# Patient Record
Sex: FEMALE | Race: WHITE | NOT HISPANIC OR LATINO | Employment: UNEMPLOYED | URBAN - METROPOLITAN AREA
[De-identification: names, ages, dates, MRNs, and addresses within clinical notes are randomized per-mention and may not be internally consistent; named-entity substitution may affect disease eponyms.]

---

## 2017-02-16 PROBLEM — J32.9 SINUSITIS: Status: ACTIVE | Noted: 2017-02-16

## 2017-03-31 PROBLEM — G47.01 INSOMNIA DUE TO MEDICAL CONDITION: Status: ACTIVE | Noted: 2017-03-31

## 2019-10-07 PROBLEM — D50.9 MICROCYTIC ANEMIA: Status: ACTIVE | Noted: 2019-10-07

## 2019-10-07 PROBLEM — R55 SYNCOPE: Status: ACTIVE | Noted: 2019-10-07

## 2019-10-07 PROBLEM — R10.9 ABDOMINAL PAIN: Status: ACTIVE | Noted: 2019-10-07

## 2019-11-01 PROBLEM — R10.9 ABDOMINAL PAIN: Status: RESOLVED | Noted: 2019-10-07 | Resolved: 2019-11-01

## 2019-12-19 PROBLEM — J32.9 SINUSITIS: Status: RESOLVED | Noted: 2017-02-16 | Resolved: 2019-12-19

## 2020-07-01 ENCOUNTER — OFFICE VISIT (OUTPATIENT)
Dept: SPINE | Facility: CLINIC | Age: 50
End: 2020-07-01
Payer: COMMERCIAL

## 2020-07-01 VITALS
HEIGHT: 62 IN | TEMPERATURE: 99 F | HEART RATE: 65 BPM | BODY MASS INDEX: 24.2 KG/M2 | DIASTOLIC BLOOD PRESSURE: 79 MMHG | WEIGHT: 131.5 LBS | SYSTOLIC BLOOD PRESSURE: 119 MMHG

## 2020-07-01 DIAGNOSIS — M54.12 RADICULOPATHY, CERVICAL REGION: ICD-10-CM

## 2020-07-01 DIAGNOSIS — M50.30 DDD (DEGENERATIVE DISC DISEASE), CERVICAL: ICD-10-CM

## 2020-07-01 PROCEDURE — 99999 PR PBB SHADOW E&M-EST. PATIENT-LVL V: ICD-10-PCS | Mod: PBBFAC,,, | Performed by: PHYSICIAN ASSISTANT

## 2020-07-01 PROCEDURE — 99999 PR PBB SHADOW E&M-EST. PATIENT-LVL V: CPT | Mod: PBBFAC,,, | Performed by: PHYSICIAN ASSISTANT

## 2020-07-01 PROCEDURE — 99204 PR OFFICE/OUTPT VISIT, NEW, LEVL IV, 45-59 MIN: ICD-10-PCS | Mod: S$GLB,,, | Performed by: PHYSICIAN ASSISTANT

## 2020-07-01 PROCEDURE — 99204 OFFICE O/P NEW MOD 45 MIN: CPT | Mod: S$GLB,,, | Performed by: PHYSICIAN ASSISTANT

## 2020-07-01 NOTE — LETTER
July 1, 2020      [place]  [address] [facility] [address]  [facility]  [place] [address]           [facility]  [address] [place] [address]  Phone: [phone]  Fax: [phone]          Patient: Helen Pickard   MR Number: [id number]   YOB: 1970   Date of Visit: 7/1/2020       Dear Dr. Tavares Newsome II:    Thank you for referring Helen Pickard to me for evaluation. Attached you will find relevant portions of my assessment and plan of care.    If you have questions, please do not hesitate to call me. I look forward to following Helen Pickard along with you.    Sincerely,    ADRIANA Rinconosure  CC:  No Recipients    If you would like to receive this communication electronically, please contact [email or web address] or [phone] to request more information on [employer] access.    For providers and/or their staff who would like to refer a patient to [facility], please contact us through our one-stop-shop provider referral line, [facility], at [phone].    If you feel you have received this communication in error or would no longer like to receive these types of communications, please e-mail [email or web address]

## 2020-07-01 NOTE — PROGRESS NOTES
[facility]    Patient ID: Helen Pickard is a 49 y.o. female.    Chief Complaint   Patient presents with    Neck Pain    Shoulder Pain       Review of Systems   Constitutional: Negative for activity change, chills, fever and unexpected weight change.   HENT: Negative for hearing loss, tinnitus, trouble swallowing and voice change.    Eyes: Negative.  Negative for visual disturbance.   Respiratory: Negative for apnea, chest tightness and shortness of breath.    Cardiovascular: Negative.  Negative for chest pain and palpitations.   Gastrointestinal: Negative.  Negative for abdominal pain, constipation, diarrhea, nausea and vomiting.   Genitourinary: Negative.  Negative for difficulty urinating, dysuria and frequency.   Musculoskeletal: Positive for myalgias, neck pain and neck stiffness. Negative for back pain and gait problem.   Skin: Negative for wound.   Allergic/Immunologic: Negative for immunocompromised state.   Neurological: Positive for weakness. Negative for dizziness, tremors, seizures, facial asymmetry, speech difficulty, light-headedness, numbness and headaches.   Hematological: Negative for adenopathy. Does not bruise/bleed easily.   Psychiatric/Behavioral: Negative for confusion, decreased concentration, dysphoric mood and sleep disturbance.       Past Medical History:   Diagnosis Date    b Hypertension     8/3/15 RXd A Low Salt Diet     c Hypercholesterolemia With High HDL     9/25/18 RXd Lifestyle Changes    c Mild Hypertriglyceridemia     d Family H/O DM     f Obesity     This; 9/24/18 TFTs, JULIANA, And Cortisol = Unremarkable    f Osteopenia     10/19/18 RXd Fosamax 35 Mg Weekly, Continued Her OTC D3 5K IU daily, And RXd OTC CaCO3 1,200 Mg Daily; She Is Also On HRT    f Weight Gain ###    9/20/18 Likely Due To Rixulti And I Recommended She F/U With Her Psychiatrist For This; 9/24/18 TFTs, JULIANA, And Cortisol = Unremarkable    g Chronic Mild Iron Deficiency Anemia      "1/31/20 RXd Ferrous Gluconate 324 Mg Daily; Likely Do To Post Bariatric Surgery Decreased Absorption    j Family H/O Colon Polyps     Dr. Justino Mckeon 12/14/15: "Repeat TC In 5 YRs"    j GERD     j H/O Bariatric Sleeve Gastrectomy In 2011 ###    5/2/15 Fe+ Studies And B12 = Normal    j H/O Laparoscopic Cholecystectomy On 10/7/19     Dr. Rc canela H/O Right Hemicolectomy 2014 For (A Benign) Appendix     Dr. Ilda canela Irritable Bowel Syndrome     Dr. Justino Mckeon    k H/O Left Breast Lumpectomy For Benign Mass In 2014     Dr. Ilda mcclain Postmenopausal On Chronic Hormone Replacement Therapy     l Bilateral Hand Tendonitis     5/2/15 RF, SARKIS, UA, CPK And ESR = Normal    l Fibromyalgia ###    6/3/19 (Phone Log) RXd Amitriptyline 50 Mg BID; 6/3/19 RXd Savella 25 Mg BID But This Was Too Expensive; 12/4/15 Referred To Dr. Wu Baird; Lyrica And Cymbalta And Neurontin Innefective; 5/2/15 RF, SARKIS, UA, CPK And ESR = Normal    l Left 3rd Finger Laceration 8/2/15     l Lumbar DDD With Chronic LBP     Dr. Tristan Banda Started Her On Lyrica    l Right Arthropathy Shoulder     5/8/20 Referred To Dr. Tavares otoole Facial And RUE Muscle Twitching     12/4/15 Referred To Dr. Wu Baird    n Anxiety And Depression ###    11/4/19 (Phone Log) RXd Xanax 0.25 Mg Daily PRN., [facility] Saw Her On 12/12/16 And 6/22/16 And 5/17/16     n Attention Deficit Hyperactivity Disorder ###    11/2015 She Tolerated Concerta But Stopped It; Aderrall And Vyvanse Caused Moodiness    n Bipolar Affective Disorder ###    [facility] Saw Her On 12/12/16 And 6/22/16 And 5/17/16     o Migraines     q Vitamin D Deficiency     9/25/18 RXd OTC D3 5K IU Daily    Wellness Visit 5/8/2020      Past Surgical History:   Procedure Laterality Date    APPENDECTOMY      BREAST BIOPSY      CHOLECYSTECTOMY  " 10/07/2019    COLON SURGERY      HYSTERECTOMY      LAPAROSCOPIC CHOLECYSTECTOMY N/A 10/7/2019    Procedure: CHOLECYSTECTOMY, LAPAROSCOPIC;  Surgeon: Rc Rico MD;  Location: [facility];  Service: General;  Laterality: N/A;    STOMACH SURGERY      gastric sleeve     Social History     Socioeconomic History    Marital status:      Spouse name: Edilberto    Number of children: 3    Years of education: Not on file    Highest education level: Not on file   Occupational History    Not on file   Social Needs    Financial resource strain: Not on file    Food insecurity     Worry: Never true     Inability: Never true    Transportation needs     Medical: No     Non-medical: No   Tobacco Use    Smoking status: Never Smoker    Smokeless tobacco: Never Used   Substance and Sexual Activity    Alcohol use: Yes     Frequency: Never     Binge frequency: Never     Comment: occasionally    Drug use: Never    Sexual activity: Yes     Partners: Male     Birth control/protection: See Surgical Hx, None   Lifestyle    Physical activity     Days per week: 0 days     Minutes per session: 0 min    Stress: Very much   Relationships    Social connections     Talks on phone: More than three times a week     Gets together: Once a week     Attends [employer] service: Not on file     Active member of club or organization: Yes     Attends meetings of clubs or organizations: More than 4 times per year     Relationship status: Patient refused   Other Topics Concern    Not on file   Social History Narrative    Not on file     Family History   Problem Relation Age of Onset    Asthma Mother     Depression Mother     Hypertension Mother     Hyperlipidemia Mother     Anxiety disorder Mother     Thyroid disease Mother     Depression Father     Hypertension Father     Heart disease Father     Cancer Maternal Grandfather         LEUKEMIA     Review of patient's allergies indicates:   Allergen Reactions    Gabapentin Other  (See Comments)     Confusion, brain fog and thomas    Iodinated contrast media Shortness Of Breath     Itchy, nausea    Abilify [aripiprazole]     Blue dye     Elavil [amitriptyline]     Mirabegron     Morphine      Hallucinations      Red dye     Rexulti [brexpiprazole]     Tizanidine     Tramadol Other (See Comments)     Feels drunk    Wellbutrin [bupropion hcl] Other (See Comments)     Depressed and couldn't get out of bed    Yellow dye        Current Outpatient Medications:     acetaminophen (TYLENOL) 325 MG tablet, Take 2 tablets (650 mg total) by mouth every 8 (eight) hours as needed for Temperature greater than (or equal to 101 degree F)., Disp: , Rfl: 0    alendronate (FOSAMAX) 35 MG tablet, Take 1 tablet (35 mg total) by mouth every 7 days. (Patient taking differently: Take 35 mg by mouth Every Friday. ), Disp: 12 tablet, Rfl: 3    cetirizine (ZYRTEC) 10 MG tablet, Take 10 mg by mouth once daily., Disp: , Rfl:     cholecalciferol, vitamin D3, 5,000 unit capsule, , Disp: , Rfl:     estradioL (ESTRACE) 1 MG tablet, , Disp: , Rfl:     Lactobacillus acidophilus (PROBIOTIC) 10 billion cell Cap, Probiotic, Disp: , Rfl:     lamoTRIgine (LAMICTAL) 25 MG tablet, , Disp: , Rfl:     losartan (COZAAR) 100 MG tablet, Take 1 tablet (100 mg total) by mouth once daily., Disp: 90 tablet, Rfl: 1    ondansetron (ZOFRAN) 4 MG tablet, Take 1 tablet (4 mg total) by mouth every 6 (six) hours as needed for Nausea., Disp: 20 tablet, Rfl: 0    ondansetron (ZOFRAN-ODT) 4 MG TbDL, Take 2 tablets (8 mg total) by mouth every 6 (six) hours as needed (nausea)., Disp: 60 tablet, Rfl: 1    pantoprazole (PROTONIX) 40 MG tablet, Take 1 tablet (40 mg total) by mouth 2 (two) times daily., Disp: 180 tablet, Rfl: 1    polyethylene glycol (GLYCOLAX) 17 gram PwPk, Take 17 g by mouth once daily., Disp: 20 each, Rfl: 0    psyllium (METAMUCIL) powder, Take 1 packet by mouth 3 (three) times daily., Disp: , Rfl:     traZODone  "(DESYREL) 100 MG tablet, Take 400 mg by mouth every evening. , Disp: , Rfl:     TRINTELLIX 20 mg Tab, Take 1 tablet by mouth once daily., Disp: , Rfl: 2    ALPRAZolam (XANAX) 0.25 MG tablet, Take 1-2 tablets (0.25-0.5 mg total) by mouth 2 (two) times daily as needed for Anxiety., Disp: 30 tablet, Rfl: 0    docusate sodium (COLACE) 100 MG capsule, Take 1 capsule (100 mg total) by mouth 2 (two) times daily as needed for Constipation., Disp: , Rfl: 0    Vitals:    07/01/20 1427   BP: 119/79   Pulse: 65   Temp: 98.7 °F (37.1 °C)   Weight: 59.6 kg (131 lb 8.1 oz)   Height: 5' 2" (1.575 m)   PainSc:   6   PainLoc: Neck      Estimated body mass index is 24.05 kg/m² as calculated from the following:    Height as of this encounter: 5' 2" (1.575 m).    Weight as of this encounter: 59.6 kg (131 lb 8.1 oz).    Physical Exam  Vitals signs and nursing note reviewed.   Constitutional:       Appearance: Normal appearance. She is well-developed.   HENT:      Head: Normocephalic and atraumatic.      Right Ear: Hearing normal.      Left Ear: Hearing normal.      Nose: Nose normal.      Mouth/Throat:      Pharynx: Uvula midline.   Eyes:      General: Lids are normal.      Extraocular Movements: EOM normal.      Conjunctiva/sclera: Conjunctivae normal.      Pupils: Pupils are equal, round, and reactive to light.   Neck:      Musculoskeletal: Full passive range of motion without pain, normal range of motion and neck supple.      Trachea: Trachea and phonation normal.   Cardiovascular:      Rate and Rhythm: Normal rate and regular rhythm.      Pulses: Normal pulses.   Pulmonary:      Effort: Pulmonary effort is normal.      Breath sounds: Normal breath sounds.   Abdominal:      Palpations: Abdomen is soft.   Musculoskeletal: Normal range of motion.   Feet:      Right foot:      Protective Sensation: 4 sites tested. 4 sites sensed.      Skin integrity: No ulcer.      Left foot:      Protective Sensation: 4 sites tested. 4 sites sensed. "      Skin integrity: No ulcer.   Skin:     General: Skin is warm and dry.      Capillary Refill: Capillary refill takes less than 2 seconds.   Neurological:      Mental Status: She is alert and oriented to person, place, and time.      Cranial Nerves: No cranial nerve deficit.      Sensory: No sensory deficit.      Coordination: Finger-Nose-Finger Test, Heel to Shin Test and Romberg Test normal.      Gait: Gait is intact. Tandem walk normal.      Deep Tendon Reflexes: Strength normal and reflexes are normal and symmetric.      Reflex Scores:       Tricep reflexes are 2+ on the right side and 2+ on the left side.       Bicep reflexes are 2+ on the right side and 2+ on the left side.       Brachioradialis reflexes are 2+ on the right side and 2+ on the left side.       Patellar reflexes are 2+ on the right side and 2+ on the left side.       Achilles reflexes are 2+ on the right side and 2+ on the left side.  Psychiatric:         Speech: Speech normal.         Behavior: Behavior normal.         Thought Content: Thought content normal.         Judgment: Judgment normal.         Neurologic Exam     Mental Status   Oriented to person, place, and time.   Follows 3 step commands.   Attention: normal. Concentration: normal.   Speech: speech is normal   Level of consciousness: alert  Knowledge: good.   Able to name object.     Cranial Nerves     CN II   Visual fields full to confrontation.   Visual acuity: normal  Right visual field deficit: none  Left visual field deficit: none     CN III, IV, VI   Pupils are equal, round, and reactive to light.  Extraocular motions are normal.   CN III: no CN III palsy  CN VI: no CN VI palsy    CN V   Facial sensation intact.   Right facial sensation deficit: none  Left facial sensation deficit: none    CN VII   Facial expression full, symmetric.   Right facial weakness: none  Left facial weakness: none    CN VIII   CN VIII normal.   Hearing: intact    CN IX, X   CN IX normal.   CN X  normal.     CN XI   CN XI normal.     CN XII   CN XII normal.     Motor Exam   Muscle bulk: normal  Overall muscle tone: normal  Right arm tone: normal  Left arm tone: normal  Right arm pronator drift: absent  Left arm pronator drift: absent  Right leg tone: normal  Left leg tone: normal    Strength   Strength 5/5 throughout.   Right neck flexion: 5/5  Left neck flexion: 5/5  Right neck extension: 5/5  Left neck extension: 5/5  Right deltoid: 5/5  Left deltoid: 5/5  Right biceps: 5/5  Left biceps: 5/5  Right triceps: 5/5  Left triceps: 5/5  Right wrist flexion: 5/5  Left wrist flexion: 5/5  Right wrist extension: 5/5  Left wrist extension: 5/5  Right interossei: 5/5  Left interossei: 5/5  Right abdominals: 5/5  Left abdominals: 5/5  Right iliopsoas: 5/5  Left iliopsoas: 5/5  Right quadriceps: 5/5  Left quadriceps: 5/5  Right hamstrin/5  Left hamstrin/5  Right glutei: 5/5  Left glutei: 5/5  Right anterior tibial: 5/5  Left anterior tibial: 5/5  Right posterior tibial: 5/5  Left posterior tibial: 5/5  Right peroneal: 5/5  Left peroneal: 5/5  Right gastroc: 5/5  Left gastroc: 5/5    Sensory Exam   Light touch normal.   Right arm light touch: normal  Left arm light touch: normal  Right leg light touch: normal  Left leg light touch: normal  Vibration normal.     Gait, Coordination, and Reflexes     Gait  Gait: normal    Coordination   Romberg: negative  Finger to nose coordination: normal  Heel to shin coordination: normal  Tandem walking coordination: normal    Tremor   Resting tremor: absent  Intention tremor: absent  Action tremor: absent    Reflexes   Right brachioradialis: 2+  Left brachioradialis: 2+  Right biceps: 2+  Left biceps: 2+  Right triceps: 2+  Left triceps: 2+  Right patellar: 2+  Left patellar: 2+  Right achilles: 2+  Left achilles: 2+  Right : 2+  Left : 2+  Right plantar: normal  Left plantar: normal  Right Solorzano: absent  Left Solorzano: absent  Right ankle clonus: absent  Left ankle  clonus: absent      X-Ray Cervical Spine Complete 5 view  Narrative: EXAMINATION:  XR CERVICAL SPINE COMPLETE 5 VIEW    CLINICAL HISTORY:  . Cervicalgia    TECHNIQUE:  AP, Lateral, bilateral oblique and open mouth views of the cervical spine were performed.    COMPARISON:  05/16/2015    FINDINGS:  There is no apparent fracture or subluxation.  The prevertebral soft tissue space is not widened.  There are degenerative changes present involving mainly the C4-C5 and C5-C6 level.  Findings consist of vertebral body osteophyte formation and some narrowing of the disc spaces.  No facet joint or foraminal abnormalities were identified.  Impression: There are mild degenerative changes as described.    Electronically signed by: Aly Saucedo MD  Date:    06/29/2020  Time:    15:21      Visit Diagnosis:  DDD (degenerative disc disease), cervical  -     Ambulatory referral/consult to Neurosurgery        Provider dictation:  Oswestry score: 28%  PHQ:  10    The patient is a 49-year-old right-hand-dominant female with a past medical history of hypertension, hypercholesterolemia, previous bariatric sleeve gastrectomy, hemicolectomy, irritable bowel syndrome, fibromyalgia, anxiety and depression, attention deficit hyperactivity disorder, bipolar affective disorder, migraines that presents today for evaluation of chronic neck pain. She was referred to Dr. Tran to Dr. Mendoza.  She has had neck pain for several years however she attributed to her fibromyalgia.  She has pain that radiates from the cervical spine to the right shoulder and down to the right arm and hand.  Her arms feel heavy and tired.  She received a steroid shot in her right shoulder with only minimal relief of her symptoms.  She denies bowel or bladder dysfunction.  She does report gait disturbance with increased falls.  She reports her right arm is weaker than her left.  She is dropping things.  She has never had epidural injections in the cervical spine    On  physical examination, gait and station are normal.  Normal tandem walk.  She has mild weakness at in the bilateral upper extremities that is generalized, possible effort limited.  Muscle stretch reflexes are maintained.  Negative Solorzano's.  Negative clonus.    X-ray of the cervical spine demonstrates degenerative changes worse at C5-C6 with straightening of the normal cervical lordosis.  X-ray of the thoracic spine shows mild thoracic scoliosis.    The patient has tried physical therapy multiple times without relief of her symptoms and continues to have right-sided neck pain that has progressed in severity and now complaining of weakness.  Given the failure of conservative management, recommended MRI of the cervical spine with follow-up for further evaluation.  She may continue physical therapy in the interim.        This note was done using voice recognition software. Please excuse any errors missed in proof reading.

## 2020-07-09 ENCOUNTER — OFFICE VISIT (OUTPATIENT)
Dept: SPINE | Facility: CLINIC | Age: 50
End: 2020-07-09
Payer: COMMERCIAL

## 2020-07-09 VITALS — SYSTOLIC BLOOD PRESSURE: 107 MMHG | TEMPERATURE: 98 F | HEART RATE: 59 BPM | DIASTOLIC BLOOD PRESSURE: 70 MMHG

## 2020-07-09 DIAGNOSIS — M25.512 CHRONIC PAIN OF BOTH SHOULDERS: ICD-10-CM

## 2020-07-09 DIAGNOSIS — M25.511 CHRONIC PAIN OF BOTH SHOULDERS: ICD-10-CM

## 2020-07-09 DIAGNOSIS — M54.2 NECK PAIN: Primary | ICD-10-CM

## 2020-07-09 DIAGNOSIS — G89.29 CHRONIC PAIN OF BOTH SHOULDERS: ICD-10-CM

## 2020-07-09 DIAGNOSIS — M54.10 RADICULOPATHY, UNSPECIFIED SPINAL REGION: ICD-10-CM

## 2020-07-09 PROCEDURE — 99999 PR PBB SHADOW E&M-EST. PATIENT-LVL III: CPT | Mod: PBBFAC,,, | Performed by: PHYSICIAN ASSISTANT

## 2020-07-09 PROCEDURE — 99214 OFFICE O/P EST MOD 30 MIN: CPT | Mod: S$GLB,,, | Performed by: PHYSICIAN ASSISTANT

## 2020-07-09 PROCEDURE — 99999 PR PBB SHADOW E&M-EST. PATIENT-LVL III: ICD-10-PCS | Mod: PBBFAC,,, | Performed by: PHYSICIAN ASSISTANT

## 2020-07-09 PROCEDURE — 99214 PR OFFICE/OUTPT VISIT, EST, LEVL IV, 30-39 MIN: ICD-10-PCS | Mod: S$GLB,,, | Performed by: PHYSICIAN ASSISTANT

## 2020-07-09 RX ORDER — CLONAZEPAM 0.5 MG/1
TABLET ORAL
COMMUNITY
Start: 2020-07-06 | End: 2020-12-10

## 2020-07-09 RX ORDER — ZIPRASIDONE HYDROCHLORIDE 40 MG/1
40 CAPSULE ORAL 2 TIMES DAILY
COMMUNITY
Start: 2020-07-06 | End: 2020-12-10

## 2020-07-09 NOTE — PROGRESS NOTES
[facility]    Patient ID: Helen Pickard is a 49 y.o. female.    Chief Complaint   Patient presents with    Follow-up     MRI Results       Review of Systems   Constitutional: Negative for activity change, chills, fever and unexpected weight change.   HENT: Negative for hearing loss, tinnitus, trouble swallowing and voice change.    Eyes: Negative.  Negative for visual disturbance.   Respiratory: Negative for apnea, chest tightness and shortness of breath.    Cardiovascular: Negative.  Negative for chest pain and palpitations.   Gastrointestinal: Negative.  Negative for abdominal pain, constipation, diarrhea, nausea and vomiting.   Genitourinary: Negative.  Negative for difficulty urinating, dysuria and frequency.   Musculoskeletal: Positive for myalgias, neck pain and neck stiffness. Negative for back pain and gait problem.   Skin: Negative for wound.   Allergic/Immunologic: Negative for immunocompromised state.   Neurological: Positive for weakness. Negative for dizziness, tremors, seizures, facial asymmetry, speech difficulty, light-headedness, numbness and headaches.   Hematological: Negative for adenopathy. Does not bruise/bleed easily.   Psychiatric/Behavioral: Negative for confusion, decreased concentration, dysphoric mood and sleep disturbance.       Past Medical History:   Diagnosis Date    b Hypertension     8/3/15 RXd A Low Salt Diet     c Hypercholesterolemia With High HDL     9/25/18 RXd Lifestyle Changes    c Mild Hypertriglyceridemia     d Family H/O DM     f Obesity     This; 9/24/18 TFTs, JULIANA, And Cortisol = Unremarkable    f Osteopenia     10/19/18 RXd Fosamax 35 Mg Weekly, Continued Her OTC D3 5K IU daily, And RXd OTC CaCO3 1,200 Mg Daily; She Is Also On HRT    f Weight Gain ###    9/20/18 Likely Due To Rixulti And I Recommended She F/U With Her Psychiatrist For This; 9/24/18 TFTs, JULIANA, And Cortisol = Unremarkable    g Chronic Mild Iron Deficiency Anemia      "1/31/20 RXd Ferrous Gluconate 324 Mg Daily; Likely Do To Post Bariatric Surgery Decreased Absorption    j Family H/O Colon Polyps     Dr. Justino Mckeon 12/14/15: "Repeat TC In 5 YRs"    j GERD     j H/O Bariatric Sleeve Gastrectomy In 2011 ###    5/2/15 Fe+ Studies And B12 = Normal    j H/O Laparoscopic Cholecystectomy On 10/7/19     Dr. Rc canela H/O Right Hemicolectomy 2014 For (A Benign) Appendix     Dr. Ilda canela Irritable Bowel Syndrome     Dr. Justino Mckeon    k H/O Left Breast Lumpectomy For Benign Mass In 2014     Dr. Ilda mcclain Postmenopausal On Chronic Hormone Replacement Therapy     l Bilateral Hand Tendonitis     5/2/15 RF, SARKIS, UA, CPK And ESR = Normal    l Fibromyalgia ###    6/3/19 (Phone Log) RXd Amitriptyline 50 Mg BID; 6/3/19 RXd Savella 25 Mg BID But This Was Too Expensive; 12/4/15 Referred To Dr. Wu Baird; Lyrica And Cymbalta And Neurontin Innefective; 5/2/15 RF, SARKIS, UA, CPK And ESR = Normal    l Left 3rd Finger Laceration 8/2/15     l Lumbar DDD With Chronic LBP     Dr. Tristan Banda Started Her On Lyrica    l Right Arthropathy Shoulder     5/8/20 Referred To Dr. Tavares otoole Facial And RUE Muscle Twitching     12/4/15 Referred To Dr. Wu Baird    n Anxiety And Depression ###    11/4/19 (Phone Log) RXd Xanax 0.25 Mg Daily PRN., Dr. Daisy Cook ([facility]) Saw Her On 12/12/16 And 6/22/16 And 5/17/16     n Attention Deficit Hyperactivity Disorder ###    11/2015 She Tolerated Concerta But Stopped It; Aderrall And Vyvanse Caused Moodiness    n Bipolar Affective Disorder ###    Dr. Daisy Cook ([facility]) Saw Her On 12/12/16 And 6/22/16 And 5/17/16     o Migraines     q Vitamin D Deficiency     9/25/18 RXd OTC D3 5K IU Daily    Wellness Visit 5/8/2020      Past Surgical History:   Procedure Laterality Date    APPENDECTOMY      BREAST BIOPSY      CHOLECYSTECTOMY  " 10/07/2019    COLON SURGERY      HYSTERECTOMY      LAPAROSCOPIC CHOLECYSTECTOMY N/A 10/7/2019    Procedure: CHOLECYSTECTOMY, LAPAROSCOPIC;  Surgeon: Rc Rico MD;  Location: [facility];  Service: General;  Laterality: N/A;    STOMACH SURGERY      gastric sleeve     Social History     Socioeconomic History    Marital status:      Spouse name: Edilberto    Number of children: 3    Years of education: Not on file    Highest education level: Not on file   Occupational History    Not on file   Social Needs    Financial resource strain: Not on file    Food insecurity     Worry: Never true     Inability: Never true    Transportation needs     Medical: No     Non-medical: No   Tobacco Use    Smoking status: Never Smoker    Smokeless tobacco: Never Used   Substance and Sexual Activity    Alcohol use: Yes     Frequency: Never     Binge frequency: Never     Comment: occasionally    Drug use: Never    Sexual activity: Yes     Partners: Male     Birth control/protection: See Surgical Hx, None   Lifestyle    Physical activity     Days per week: 0 days     Minutes per session: 0 min    Stress: Very much   Relationships    Social connections     Talks on phone: More than three times a week     Gets together: Once a week     Attends Protestant service: Not on file     Active member of club or organization: Yes     Attends meetings of clubs or organizations: More than 4 times per year     Relationship status: Patient refused   Other Topics Concern    Not on file   Social History Narrative    Not on file     Family History   Problem Relation Age of Onset    Asthma Mother     Depression Mother     Hypertension Mother     Hyperlipidemia Mother     Anxiety disorder Mother     Thyroid disease Mother     Depression Father     Hypertension Father     Heart disease Father     Cancer Maternal Grandfather         LEUKEMIA     Review of patient's allergies indicates:   Allergen Reactions    Gabapentin Other  (See Comments)     Confusion, brain fog and thomas    Iodinated contrast media Shortness Of Breath     Itchy, nausea    Abilify [aripiprazole]     Blue dye     Elavil [amitriptyline]     Mirabegron     Morphine      Hallucinations      Red dye     Rexulti [brexpiprazole]     Tizanidine     Tramadol Other (See Comments)     Feels drunk    Wellbutrin [bupropion hcl] Other (See Comments)     Depressed and couldn't get out of bed    Yellow dye        Current Outpatient Medications:     acetaminophen (TYLENOL) 325 MG tablet, Take 2 tablets (650 mg total) by mouth every 8 (eight) hours as needed for Temperature greater than (or equal to 101 degree F)., Disp: , Rfl: 0    alendronate (FOSAMAX) 35 MG tablet, Take 1 tablet (35 mg total) by mouth every 7 days. (Patient taking differently: Take 35 mg by mouth Every Friday. ), Disp: 12 tablet, Rfl: 3    cetirizine (ZYRTEC) 10 MG tablet, Take 10 mg by mouth once daily., Disp: , Rfl:     clonazePAM (KLONOPIN) 0.5 MG tablet, , Disp: , Rfl:     estradioL (ESTRACE) 1 MG tablet, , Disp: , Rfl:     Lactobacillus acidophilus (PROBIOTIC) 10 billion cell Cap, Probiotic, Disp: , Rfl:     losartan (COZAAR) 100 MG tablet, Take 1 tablet (100 mg total) by mouth once daily., Disp: 90 tablet, Rfl: 1    ondansetron (ZOFRAN) 4 MG tablet, Take 1 tablet (4 mg total) by mouth every 6 (six) hours as needed for Nausea., Disp: 20 tablet, Rfl: 0    ondansetron (ZOFRAN-ODT) 4 MG TbDL, Take 2 tablets (8 mg total) by mouth every 6 (six) hours as needed (nausea)., Disp: 60 tablet, Rfl: 1    pantoprazole (PROTONIX) 40 MG tablet, Take 1 tablet (40 mg total) by mouth 2 (two) times daily., Disp: 180 tablet, Rfl: 1    psyllium (METAMUCIL) powder, Take 1 packet by mouth 3 (three) times daily., Disp: , Rfl:     traZODone (DESYREL) 100 MG tablet, Take 400 mg by mouth every evening. , Disp: , Rfl:     TRINTELLIX 20 mg Tab, Take 1 tablet by mouth once daily., Disp: , Rfl: 2    ziprasidone  "(GEODON) 20 MG Cap, , Disp: , Rfl:     ALPRAZolam (XANAX) 0.25 MG tablet, Take 1-2 tablets (0.25-0.5 mg total) by mouth 2 (two) times daily as needed for Anxiety., Disp: 30 tablet, Rfl: 0    cholecalciferol, vitamin D3, 5,000 unit capsule, , Disp: , Rfl:     docusate sodium (COLACE) 100 MG capsule, Take 1 capsule (100 mg total) by mouth 2 (two) times daily as needed for Constipation., Disp: , Rfl: 0    lamoTRIgine (LAMICTAL) 25 MG tablet, , Disp: , Rfl:     polyethylene glycol (GLYCOLAX) 17 gram PwPk, Take 17 g by mouth once daily. (Patient not taking: Reported on 7/9/2020), Disp: 20 each, Rfl: 0    Vitals:    07/09/20 1522   BP: 107/70   Pulse: (!) 59   Temp: 98.3 °F (36.8 °C)   PainSc:   8      Estimated body mass index is 24.05 kg/m² as calculated from the following:    Height as of 7/1/20: 5' 2" (1.575 m).    Weight as of 7/1/20: 59.6 kg (131 lb 8.1 oz).    Physical Exam  Vitals signs and nursing note reviewed.   Constitutional:       Appearance: Normal appearance. She is well-developed.   HENT:      Head: Normocephalic and atraumatic.      Right Ear: Hearing normal.      Left Ear: Hearing normal.      Nose: Nose normal.      Mouth/Throat:      Pharynx: Uvula midline.   Eyes:      General: Lids are normal.      Extraocular Movements: EOM normal.      Conjunctiva/sclera: Conjunctivae normal.      Pupils: Pupils are equal, round, and reactive to light.   Neck:      Musculoskeletal: Full passive range of motion without pain, normal range of motion and neck supple.      Trachea: Trachea and phonation normal.   Cardiovascular:      Rate and Rhythm: Normal rate and regular rhythm.      Pulses: Normal pulses.   Pulmonary:      Effort: Pulmonary effort is normal.      Breath sounds: Normal breath sounds.   Abdominal:      Palpations: Abdomen is soft.   Musculoskeletal: Normal range of motion.   Feet:      Right foot:      Protective Sensation: 4 sites tested. 4 sites sensed.      Skin integrity: No ulcer.      Left " foot:      Protective Sensation: 4 sites tested. 4 sites sensed.      Skin integrity: No ulcer.   Skin:     General: Skin is warm and dry.      Capillary Refill: Capillary refill takes less than 2 seconds.   Neurological:      Mental Status: She is alert and oriented to person, place, and time.      Cranial Nerves: No cranial nerve deficit.      Sensory: No sensory deficit.      Coordination: Finger-Nose-Finger Test, Heel to Shin Test and Romberg Test normal.      Gait: Gait is intact. Tandem walk normal.      Deep Tendon Reflexes: Strength normal and reflexes are normal and symmetric.      Reflex Scores:       Tricep reflexes are 2+ on the right side and 2+ on the left side.       Bicep reflexes are 2+ on the right side and 2+ on the left side.       Brachioradialis reflexes are 2+ on the right side and 2+ on the left side.       Patellar reflexes are 2+ on the right side and 2+ on the left side.       Achilles reflexes are 2+ on the right side and 2+ on the left side.  Psychiatric:         Speech: Speech normal.         Behavior: Behavior normal.         Thought Content: Thought content normal.         Judgment: Judgment normal.         Neurologic Exam     Mental Status   Oriented to person, place, and time.   Follows 3 step commands.   Attention: normal. Concentration: normal.   Speech: speech is normal   Level of consciousness: alert  Knowledge: good.   Able to name object.     Cranial Nerves     CN II   Visual fields full to confrontation.   Visual acuity: normal  Right visual field deficit: none  Left visual field deficit: none     CN III, IV, VI   Pupils are equal, round, and reactive to light.  Extraocular motions are normal.   CN III: no CN III palsy  CN VI: no CN VI palsy    CN V   Facial sensation intact.   Right facial sensation deficit: none  Left facial sensation deficit: none    CN VII   Facial expression full, symmetric.   Right facial weakness: none  Left facial weakness: none    CN VIII   CN VIII  normal.   Hearing: intact    CN IX, X   CN IX normal.   CN X normal.     CN XI   CN XI normal.     CN XII   CN XII normal.     Motor Exam   Muscle bulk: normal  Overall muscle tone: normal  Right arm tone: normal  Left arm tone: normal  Right arm pronator drift: absent  Left arm pronator drift: absent  Right leg tone: normal  Left leg tone: normal    Strength   Strength 5/5 throughout.   Right neck flexion: 5/5  Left neck flexion: 5/5  Right neck extension: 5/5  Left neck extension: 5/5  Right deltoid: 5/5  Left deltoid: 5/5  Right biceps: 5/5  Left biceps: 5/5  Right triceps: 5/5  Left triceps: 5/5  Right wrist flexion: 5/5  Left wrist flexion: 5/5  Right wrist extension: 5/5  Left wrist extension: 5/5  Right interossei: 5/5  Left interossei: 5/5  Right abdominals: 5/5  Left abdominals: 5/5  Right iliopsoas: 5/5  Left iliopsoas: 5/5  Right quadriceps: 5/5  Left quadriceps: 5/5  Right hamstrin/5  Left hamstrin/5  Right glutei: 5/5  Left glutei: 5/5  Right anterior tibial: 5/5  Left anterior tibial: 5/5  Right posterior tibial: 5/5  Left posterior tibial: 5/5  Right peroneal: 5/5  Left peroneal: 5/5  Right gastroc: 5/5  Left gastroc: 5/5    Sensory Exam   Light touch normal.   Right arm light touch: normal  Left arm light touch: normal  Right leg light touch: normal  Left leg light touch: normal  Vibration normal.     Gait, Coordination, and Reflexes     Gait  Gait: normal    Coordination   Romberg: negative  Finger to nose coordination: normal  Heel to shin coordination: normal  Tandem walking coordination: normal    Tremor   Resting tremor: absent  Intention tremor: absent  Action tremor: absent    Reflexes   Right brachioradialis: 2+  Left brachioradialis: 2+  Right biceps: 2+  Left biceps: 2+  Right triceps: 2+  Left triceps: 2+  Right patellar: 2+  Left patellar: 2+  Right achilles: 2+  Left achilles: 2+  Right : 2+  Left : 2+  Right plantar: normal  Left plantar: normal  Right Solorzano:  absent  Left Solorzano: absent  Right ankle clonus: absent  Left ankle clonus: absent      MRI Cervical Spine Without Contrast  Narrative: EXAMINATION:  MRI CERVICAL SPINE WITHOUT CONTRAST    CLINICAL HISTORY:  Cervical radiculopathy;right C6 radiculopathy. no improvement with PT;.  Radiculopathy, cervical region.    TECHNIQUE:  Multiplanar, multisequence MR images of the cervical spine were acquired without the administration of contrast.    COMPARISON:  No prior MRI or CT comparison studies are available.    FINDINGS:  The cervical vertebral body heights are preserved.  The static anterior-posterior cervical vertebral body alignment is within normal limits. There is straightening of the normal cervical lordosis which could be related to muscular spasm and/or positioning.  No suspicious bone marrow edema suggestive of acute fracture is visualized.  The cervical cord demonstrates no definite abnormal T2 signal suggestive of definite myelomalacia or cord edema.    C2-C3 demonstrates no significant posterior disc protrusion, central spinal canal stenosis, or neural foraminal stenosis.    C3-C4 demonstrates no significant posterior disc protrusion, central spinal canal stenosis, or neural foraminal stenosis.    C4-C5 demonstrates minimal broad-based posterior disc osteophyte complex slightly asymmetric to the right without significant central spinal canal or neural foraminal stenosis.    C5-C6 demonstrates mild broad-based posterior disc osteophyte complex slightly asymmetric to the left without significant overall central spinal canal or right neural foraminal stenosis.  Minimal left neural foraminal narrowing is noted.    C6-C7 demonstrates minimal broad-based posterior disc osteophyte complex and mild left greater than right facet arthrosis.  No significant central spinal canal or right neural foraminal stenosis is seen.  There is suspected minimal left neural foraminal narrowing.    C7-T1 demonstrates no significant  posterior disc protrusion, central spinal canal stenosis, or neural foraminal stenosis.  Impression: 1. Mild multilevel cervical spondylosis is seen without significant overall central spinal canal stenosis.  There is suspected minimal left neural foraminal narrowing at C5-C6 and C6-C7.  2. There is straightening of the normal cervical lordosis which could be related to muscular spasm and/or positioning.    Electronically signed by: Pascual Vincent MD  Date:    07/09/2020  Time:    15:28      Visit Diagnosis:  Neck pain    Chronic pain of both shoulders    Radiculopathy, unspecified spinal region  -     Ambulatory referral/consult to Physical Medicine Rehab; Future; Expected date: 07/16/2020  -     EMG W/ ULTRASOUND AND NERVE CONDUCTION TEST 2 Extremities; Future        Provider dictation:  Oswestry score: 28%  PHQ:  10    The patient presents today for follow-up evaluation after MRI.  She reports the shoulder injection she received did not help but she continues to have pain in her bilateral shoulders and radiation into her bilateral arms.  She wants to get to the bottom of this and have a diagnosis as to what is causing her pain so it can be treated.  She reports pain is worse in the right shoulder however she does have bilateral shoulder pain.  She has pain that radiates from the neck into the shoulder and then down into the radial forearm.  No change since I last saw her.    Her last office visit note,  The patient is a 49-year-old right-hand-dominant female with a past medical history of hypertension, hypercholesterolemia, previous bariatric sleeve gastrectomy, hemicolectomy, irritable bowel syndrome, fibromyalgia, anxiety and depression, attention deficit hyperactivity disorder, bipolar affective disorder, migraines that presents today for evaluation of chronic neck pain. She was referred to Dr. Tran to Dr. Mendoza.  She has had neck pain for several years however she attributed to her fibromyalgia.  She has  pain that radiates from the cervical spine to the right shoulder and down to the right arm and hand.  Her arms feel heavy and tired.  She received a steroid shot in her right shoulder with only minimal relief of her symptoms.  She denies bowel or bladder dysfunction.  She does report gait disturbance with increased falls.  She reports her right arm is weaker than her left.  She is dropping things.  She has never had epidural injections in the cervical spine    On physical examination, gait and station are normal.  Normal tandem walk.  She has mild weakness at in the bilateral upper extremities that is generalized, possible effort limited.  Muscle stretch reflexes are maintained.  Negative Solorzano's.  Negative clonus.    X-ray of the cervical spine demonstrates degenerative changes worse at C5-C6 with straightening of the normal cervical lordosis.  X-ray of the thoracic spine shows mild thoracic scoliosis.    MRI of the cervical spine independently reviewed.  There is a very mild disc herniation at C5-C6 and C6-C7 without significant foraminal stenosis and no central stenosis.  Overall MRI is normal for age.    I have reviewed the imaging extensively with the patient.  I cannot correlate her symptoms based on a cervical pathology based on the MRI.  Because of the radicular pattern and nature that she is experiencing of recommended an EMG for further evaluation.  She had no relief with bilateral shoulder injections.  She may require trigger point injections to help with some of the pain around the shoulder.  I have also recommended her to see Physical Medicine rehab to see if this would be useful.  I have recommended her to follow up with Dr. Tran regarding shoulder pain.  There is no neurosurgical intervention or the cervical spine.  Given the negative pathology on MRI I do not recommend ANDRZEJ at this time unless the EMG is positive. Follow up with me PRN      This note was done using voice recognition software.  Please excuse any errors missed in proof reading.

## 2020-07-29 ENCOUNTER — TELEPHONE (OUTPATIENT)
Dept: PAIN MEDICINE | Facility: CLINIC | Age: 50
End: 2020-07-29

## 2020-07-29 ENCOUNTER — TELEPHONE (OUTPATIENT)
Dept: SPINE | Facility: CLINIC | Age: 50
End: 2020-07-29

## 2020-07-29 ENCOUNTER — OFFICE VISIT (OUTPATIENT)
Dept: PHYSICAL MEDICINE AND REHAB | Facility: CLINIC | Age: 50
End: 2020-07-29
Payer: COMMERCIAL

## 2020-07-29 DIAGNOSIS — M79.602 PAIN IN BOTH UPPER EXTREMITIES: ICD-10-CM

## 2020-07-29 DIAGNOSIS — M79.18 MYOFASCIAL PAIN: Primary | ICD-10-CM

## 2020-07-29 DIAGNOSIS — M79.601 PAIN IN BOTH UPPER EXTREMITIES: ICD-10-CM

## 2020-07-29 PROCEDURE — 99499 NO LOS: ICD-10-PCS | Mod: S$GLB,,, | Performed by: PHYSICAL MEDICINE & REHABILITATION

## 2020-07-29 PROCEDURE — 95886 MUSC TEST DONE W/N TEST COMP: CPT | Mod: S$GLB,,, | Performed by: PHYSICAL MEDICINE & REHABILITATION

## 2020-07-29 PROCEDURE — 95910 PR NERVE CONDUCTION STUDY; 7-8 STUDIES: ICD-10-PCS | Mod: S$GLB,,, | Performed by: PHYSICAL MEDICINE & REHABILITATION

## 2020-07-29 PROCEDURE — 95910 NRV CNDJ TEST 7-8 STUDIES: CPT | Mod: S$GLB,,, | Performed by: PHYSICAL MEDICINE & REHABILITATION

## 2020-07-29 PROCEDURE — 99999 PR PBB SHADOW E&M-EST. PATIENT-LVL II: CPT | Mod: PBBFAC,,, | Performed by: PHYSICAL MEDICINE & REHABILITATION

## 2020-07-29 PROCEDURE — 99499 UNLISTED E&M SERVICE: CPT | Mod: S$GLB,,, | Performed by: PHYSICAL MEDICINE & REHABILITATION

## 2020-07-29 PROCEDURE — 99999 PR PBB SHADOW E&M-EST. PATIENT-LVL II: ICD-10-PCS | Mod: PBBFAC,,, | Performed by: PHYSICAL MEDICINE & REHABILITATION

## 2020-07-29 PROCEDURE — 95886 PR EMG COMPLETE, W/ NERVE CONDUCTION STUDIES, 5+ MUSCLES: ICD-10-PCS | Mod: S$GLB,,, | Performed by: PHYSICAL MEDICINE & REHABILITATION

## 2020-07-29 NOTE — LETTER
July 29, 2020      Joycelyn Jimenez PA-C  [address] [facility] [address]           [place] [facility]  [address] [place] [address]  Phone: [phone]  Fax: [phone]          Patient: Helen Pickard   MR Number: [id number]   YOB: 1970   Date of Visit: 7/29/2020       Dear Joycelyn Jimenez:    Thank you for referring Helen Pickard to me for evaluation. Attached you will find relevant portions of my assessment and plan of care.    If you have questions, please do not hesitate to call me. I look forward to following Helen Pickard along with you.    Sincerely,    Og Garcia MD    Enclosure  CC:  No Recipients    If you would like to receive this communication electronically, please contact [email or web address] or [phone] to request more information on [employer] Link access.    For providers and/or their staff who would like to refer a patient to [facility], please contact us through our one-stop-shop provider referral line, [facility], at [phone].    If you feel you have received this communication in error or would no longer like to receive these types of communications, please e-mail [email or web address]

## 2020-07-29 NOTE — TELEPHONE ENCOUNTER
Called patient to schedule her an appointment with pain management in [place], [facility] voicemail, left return call message.

## 2020-07-29 NOTE — TELEPHONE ENCOUNTER
----- Message from Joycelyn Jimenez PA-C sent at 7/29/2020 10:23 AM CDT -----  Regarding: Pain Management Appointment  Hey Ms. Noel,   Patient had EMG today which was negative (no pinched nerves in neck). Her MRI is also negative for anything surgical.     I put in a referral to Dr. Souleymane Gee in [place] with [facility] to consider trigger point injections for her.     Can you please notify her of above and make her an appointment with Dr. Gee.     Thank you,  Joycelyn

## 2020-07-29 NOTE — PROGRESS NOTES
[facility]  [address] [facility] [address]  [place] [address]             Full Name: Helen Pickard Gender: Female  Patient ID: [id number] YOB: 1970  History: Pt is a 49 year old female referred for BUE EMG by Joycelyn Jimenez. She has been having neck pain for several years with arm/shoulder pain (R>L). Also having numbness and tingling in both hands. Also having weakness in both arms. No history of diabetes.       Visit Date: 7/29/2020 09:05  Age: 49 Years 9 Months Old  Examining Physician: Dr. Garcia  Referring Physician: Joycelyn Jimenez  Height: 5 feet 2 inch      Sensory NCS      Nerve / Sites Rec. Site Onset Lat Peak Lat NP Amp PP Amp Segments Distance Velocity     ms ms µV µV  cm m/s   L Median - Digit III (Antidromic)      Wrist Dig III 2.92 3.49 20.5 53.9 Wrist - Dig III 14 48   R Median - Digit III (Antidromic)      Wrist Dig III 2.97 3.54 24.4 40.8 Wrist - Dig III 14 47   L Ulnar - Digit V (Antidromic)      Wrist Dig V 2.45 3.18 14.7 26.0 Wrist - Dig V 14 57   R Ulnar - Digit V (Antidromic)      Wrist Dig V 2.45 3.07 18.5 29.8 Wrist - Dig V 14 57       Motor NCS      Nerve / Sites Muscle Latency Amplitude Amp % Duration Segments Distance Lat Diff Velocity     ms mV % ms  cm ms m/s   R Median - APB      Wrist APB 3.44 9.9 100 5.31 Wrist - APB 8        Elbow APB 7.14 10.1 102 5.36 Elbow - Wrist 20 3.70 54   L Median - APB      Wrist APB 3.44 11.5 100 5.83 Wrist - APB 8        Elbow APB 7.40 10.6 92.4 6.35 Elbow - Wrist 22 3.96 56   R Ulnar - ADM      Wrist ADM 2.50 11.6 100 5.83 Wrist - ADM 8        B.Elbow ADM 5.05 11.6 100 5.83 B.Elbow - Wrist 16 2.55 63      A.Elbow ADM 6.67 11.4 98.5 5.83 A.Elbow - B.Elbow 10 1.61 62   L Ulnar - ADM      Wrist ADM 2.66 12.2 100 6.87 Wrist - ADM 8        B.Elbow ADM 5.47 11.7 96.3 6.72 B.Elbow - Wrist 16 2.81 57      A.Elbow ADM 7.97 11.2 91.4 6.87 A.Elbow - B.Elbow 10 2.50 40       EMG         EMG Summary Table     Spontaneous MUAP Recruitment    Muscle IA Fib PSW Fasc H.F. Amp Dur. PPP Pattern   R. Deltoid N None None None None N N N N   R. Biceps brachii N None None None None N N N N   R. Triceps brachii N None None None None N N N N   R. Pronator teres N None None None None N N N N   R. First dorsal interosseous N None None None None N N N N   R. Abductor pollicis brevis N None None None None N N N N   R. Cervical paraspinals N None None None None           Summary    The motor conduction test was performed on 4 nerve(s). The results were normal in 3 nerve(s): R Median - APB, L Median - APB, R Ulnar - ADM. Results outside the specified normal range were found in 1 nerve(s), as follows:   In the L Ulnar - ADM study  o the take off velocity result was reduced for A.Elbow - B.Elbow segment    The sensory conduction test was performed on 4 nerve(s). The results were normal in 4 nerve(s): L Median - Digit III (Antidromic), R Median - Digit III (Antidromic), R Ulnar - Digit V (Antidromic), L Ulnar - Digit V (Antidromic).     The needle EMG study was normal in all 7 tested muscles: R. Deltoid, R. Biceps brachii, R. Triceps brachii, R. Pronator teres, R. First dorsal interosseous, R. Abductor pollicis brevis, R. Cervical paraspinals.      Electrodiagnostic Impression:  1. Abnormalities on today's electrodiagnostic testing were isolated to the left ulnar motor nerve conduction studies, in the form of conduction velocity slowing across the left elbow.  However, distal motor and sensory latencies and amplitudes were well within normal limits.  This finding could be consistent with cubital tunnel syndrome, however findings were insufficient to meet the criteria for overt left ulnar neuropathy.  2. There was insufficient electrodiagnostic evidence for diagnoses of peripheral polyneuropathy, myopathy, or active axonal cervical radiculopathy/brachial plexopathy of the right upper extremity.    Summary:  Ulnar nerve conduction velocity slowing across the left elbow,  see above.    Plan:  Today's test results will be sent to her treating providers for further review and direction in her treatment.    Thank you very much for the referral. Please call if you have any questions regarding this study or the report.       -------------------------------  Og Garcia M.D.

## 2020-07-29 NOTE — TELEPHONE ENCOUNTER
Return pt's call and schedule an appointment with Dr. Gee on 7.31.2020 at [place]  ----- Message from Clifton Osman sent at 7/29/2020  3:41 PM CDT -----  Regarding: returning a missed call  Type:  Patient Returning Call    Who Called: PT  Who Left Message for Patient: JUNIOR duncan   Does the patient know what this is regarding?: yes   Would the patient rather a call back or a response via [facility]? Call back   Best Call Back Number: [phone] (home)   Additional Information: n/a

## 2020-07-29 NOTE — TELEPHONE ENCOUNTER
----- Message from Татьяна Moore sent at 7/29/2020 10:53 AM CDT -----  Contact: pt  Type:  Patient Returning Call    Who Called:Helen  Who Left Message for Patient:  Does the patient know what this is regarding?:yes appt  Would the patient rather a call back or a response via [facility]? call  Best Call Back Number:[phone]  Additional Information:

## 2020-07-29 NOTE — TELEPHONE ENCOUNTER
----- Message from Joycelyn Jimenez PA-C sent at 7/29/2020 10:23 AM CDT -----  Regarding: Pain Management Appointment  Hey Ms. Noel,   Patient had EMG today which was negative (no pinched nerves in neck). Her MRI is also negative for anything surgical.     I put in a referral to Dr. Souleymane Gee in [place] with Judy to consider trigger point injections for her.     Can you please notify her of above and make her an appointment with Dr. Gee.     Thank you,  Joycelyn

## 2020-07-29 NOTE — TELEPHONE ENCOUNTER
Edna will reach out to dionna pt scheduled as pt want to know where clinic is and how to get there//dp

## 2020-07-29 NOTE — TELEPHONE ENCOUNTER
Spoke with patient and gave her the results of her EMG and MRI as per Joycelyn Jimenez, she indicated understanding and said she has an appointment to see Dr. Gee on Friday 7-31-20.

## 2020-07-29 NOTE — TELEPHONE ENCOUNTER
----- Message from Joycelyn Jimenez PA-C sent at 7/29/2020 10:23 AM CDT -----  Regarding: Pain Management Appointment  Hey Ms. Noel,   Patient had EMG today which was negative (no pinched nerves in neck). Her MRI is also negative for anything surgical.     I put in a referral to Dr. Souleymane Gee in [place] with Judy to consider trigger point injections for her.     Can you please notify her of above and make her an appointment with Dr. Gee.     Thank you,  Joycelyn

## 2020-07-31 ENCOUNTER — OFFICE VISIT (OUTPATIENT)
Dept: PAIN MEDICINE | Facility: CLINIC | Age: 50
End: 2020-07-31
Payer: COMMERCIAL

## 2020-07-31 VITALS
SYSTOLIC BLOOD PRESSURE: 128 MMHG | WEIGHT: 130.75 LBS | BODY MASS INDEX: 23.17 KG/M2 | DIASTOLIC BLOOD PRESSURE: 72 MMHG | HEART RATE: 73 BPM | HEIGHT: 63 IN | TEMPERATURE: 99 F | OXYGEN SATURATION: 98 %

## 2020-07-31 DIAGNOSIS — M79.18 MYOFASCIAL PAIN: ICD-10-CM

## 2020-07-31 DIAGNOSIS — M79.12 MYALGIA OF AUXILIARY MUSCLES, HEAD AND NECK: Primary | ICD-10-CM

## 2020-07-31 DIAGNOSIS — M79.18 CHRONIC MYOFASCIAL PAIN: ICD-10-CM

## 2020-07-31 DIAGNOSIS — M79.7 FIBROMYALGIA: ICD-10-CM

## 2020-07-31 DIAGNOSIS — G89.29 CHRONIC MYOFASCIAL PAIN: ICD-10-CM

## 2020-07-31 PROCEDURE — 99999 PR PBB SHADOW E&M-EST. PATIENT-LVL III: CPT | Mod: PBBFAC,,, | Performed by: PHYSICAL MEDICINE & REHABILITATION

## 2020-07-31 PROCEDURE — 99999 PR PBB SHADOW E&M-EST. PATIENT-LVL III: ICD-10-PCS | Mod: PBBFAC,,, | Performed by: PHYSICAL MEDICINE & REHABILITATION

## 2020-07-31 PROCEDURE — 99204 OFFICE O/P NEW MOD 45 MIN: CPT | Mod: 25,S$GLB,, | Performed by: PHYSICAL MEDICINE & REHABILITATION

## 2020-07-31 PROCEDURE — 20552 NJX 1/MLT TRIGGER POINT 1/2: CPT | Mod: S$GLB,,, | Performed by: PHYSICAL MEDICINE & REHABILITATION

## 2020-07-31 PROCEDURE — 99204 PR OFFICE/OUTPT VISIT, NEW, LEVL IV, 45-59 MIN: ICD-10-PCS | Mod: 25,S$GLB,, | Performed by: PHYSICAL MEDICINE & REHABILITATION

## 2020-07-31 PROCEDURE — 20552 PR INJECT TRIGGER POINT, 1 OR 2: ICD-10-PCS | Mod: S$GLB,,, | Performed by: PHYSICAL MEDICINE & REHABILITATION

## 2020-07-31 RX ORDER — ALENDRONATE SODIUM 35 MG/1
35 TABLET ORAL WEEKLY
COMMUNITY
Start: 2018-10-19 | End: 2020-12-10

## 2020-07-31 RX ORDER — LOSARTAN POTASSIUM 100 MG/1
100 TABLET ORAL DAILY
COMMUNITY
Start: 2020-06-18 | End: 2021-03-29 | Stop reason: SDUPTHER

## 2020-07-31 RX ORDER — TIZANIDINE 2 MG/1
2-4 TABLET ORAL EVERY 8 HOURS PRN
Qty: 60 TABLET | Refills: 2 | Status: SHIPPED | OUTPATIENT
Start: 2020-07-31 | End: 2020-08-05 | Stop reason: SINTOL

## 2020-07-31 RX ORDER — ONDANSETRON 4 MG/1
4 TABLET, ORALLY DISINTEGRATING ORAL
COMMUNITY
Start: 2020-05-13 | End: 2021-03-29 | Stop reason: SDUPTHER

## 2020-07-31 RX ORDER — PROMETHAZINE HYDROCHLORIDE AND DEXTROMETHORPHAN HYDROBROMIDE 6.25; 15 MG/5ML; MG/5ML
6.25 SYRUP ORAL
COMMUNITY
Start: 2020-07-27 | End: 2020-12-10

## 2020-07-31 RX ORDER — SERTRALINE HYDROCHLORIDE 50 MG/1
100 TABLET, FILM COATED ORAL DAILY
COMMUNITY
Start: 2020-07-13 | End: 2020-08-24

## 2020-07-31 RX ORDER — CLONAZEPAM 0.5 MG/1
0.5 TABLET ORAL DAILY
COMMUNITY
Start: 2020-02-20 | End: 2020-12-10 | Stop reason: SDUPTHER

## 2020-07-31 RX ORDER — METHYLPREDNISOLONE ACETATE 40 MG/ML
40 INJECTION, SUSPENSION INTRA-ARTICULAR; INTRALESIONAL; INTRAMUSCULAR; SOFT TISSUE
Status: COMPLETED | OUTPATIENT
Start: 2020-07-31 | End: 2020-07-31

## 2020-07-31 RX ORDER — ACETAMINOPHEN 500 MG
5000 TABLET ORAL DAILY
Qty: 90 TABLET | Refills: 3 | Status: SHIPPED | OUTPATIENT
Start: 2020-07-31

## 2020-07-31 RX ADMIN — METHYLPREDNISOLONE ACETATE 40 MG: 40 INJECTION, SUSPENSION INTRA-ARTICULAR; INTRALESIONAL; INTRAMUSCULAR; SOFT TISSUE at 11:07

## 2020-07-31 NOTE — PROGRESS NOTES
New Patient Chronic Pain Note (Initial Visit)    Referring Physician:     PCP: Tae Mccullough MD    Chief Complaint:   Chief Complaint   Patient presents with    Neck Pain    Muscle Pain    Shoulder Pain        SUBJECTIVE:    Helen Pickard is a 49 y.o. female, right-hand dominant, who presents to the clinic for the evaluation of neck pain.  She was referred by the neurosurgery department for further evaluation management of this pain, and also for potential trigger point injections.  Of note, patient has a past medical history of hypertension, hypercholesterolemia, previous bariatric sleeve gastrectomy, hemicolectomy, IBS, fibromyalgia, anxiety/depression, ADHD, bipolar affective disorder, chronic migraine, and multiple other medical comorbidities as listed below.  The pain started 25+ years ago following multiple stressful events and symptoms have been unchanged.The pain is located in the cervical myofascial area and radiates to the bilateral upper extremities.  The pain is described as Heavy, tingling, aching and is rated as  6-7/10. The pain is rated with a score of  4/10 on the BEST day and a score of 10/10 on the WORST day.  Symptoms interfere with daily activity, sleeping and work. The pain is exacerbated by activity and stress.  The pain is mitigated by improved mood. The patient reports spending 4-6 hours per day reclining. The patient reports 6-7 hours of uninterrupted sleep per night.  The patient reports that she has multiple stressful events ongoing, which appears to be mostly related to her son who has had legal problems and is currently on parole.    Patient denies night fever/night sweats, urinary incontinence, bowel incontinence, significant weight loss, significant motor weakness and loss of sensations.    Pain Disability Index Review:   No flowsheet data found.    Non-Pharmacologic Treatments:  Physical Therapy/Home Exercise: yes  Ice/Heat:yes  TENS: yes  Acupuncture: yes  Massage:  yes  Chiropractic: yes    Other: no      Pain Medications:  - Opioids: Percocet  - Adjuvant Medications: Clonazepam, alprazolam, Ambien, Belsomra, Tylenol, Lamictal, trazodone, Geodon, Trentellix, Elavil, gabapentin, Lyrica, CBD, Mucinex  - Anti-Coagulants: None     report:  Reviewed and consistent with medication use as prescribed.      Pain Procedures:   -right subacromial bursa injection with limited relief      Imaging & EMG/NCS:   EMG/NCS bilateral upper extremity 07/29/2020:  Electrodiagnostic Impression:  1. Abnormalities on today's electrodiagnostic testing were isolated to the left ulnar motor nerve conduction studies, in the form of conduction velocity slowing across the left elbow.  However, distal motor and sensory latencies and amplitudes were well within normal limits.  This finding could be consistent with cubital tunnel syndrome, however findings were insufficient to meet the criteria for overt left ulnar neuropathy.  2. There was insufficient electrodiagnostic evidence for diagnoses of peripheral polyneuropathy, myopathy, or active axonal cervical radiculopathy/brachial plexopathy of the right upper extremity.     Summary:  Ulnar nerve conduction velocity slowing across the left elbow, see above.      MRI cervical spine 07/09/2020:  The cervical vertebral body heights are preserved.  The static anterior-posterior cervical vertebral body alignment is within normal limits. There is straightening of the normal cervical lordosis which could be related to muscular spasm and/or positioning.  No suspicious bone marrow edema suggestive of acute fracture is visualized.  The cervical cord demonstrates no definite abnormal T2 signal suggestive of definite myelomalacia or cord edema.     C2-C3 demonstrates no significant posterior disc protrusion, central spinal canal stenosis, or neural foraminal stenosis.     C3-C4 demonstrates no significant posterior disc protrusion, central spinal canal stenosis, or  neural foraminal stenosis.     C4-C5 demonstrates minimal broad-based posterior disc osteophyte complex slightly asymmetric to the right without significant central spinal canal or neural foraminal stenosis.     C5-C6 demonstrates mild broad-based posterior disc osteophyte complex slightly asymmetric to the left without significant overall central spinal canal or right neural foraminal stenosis.  Minimal left neural foraminal narrowing is noted.     C6-C7 demonstrates minimal broad-based posterior disc osteophyte complex and mild left greater than right facet arthrosis.  No significant central spinal canal or right neural foraminal stenosis is seen.  There is suspected minimal left neural foraminal narrowing.     C7-T1 demonstrates no significant posterior disc protrusion, central spinal canal stenosis, or neural foraminal stenosis.    X-ray cervical spine 06/29/2020:  There is no apparent fracture or subluxation.  The prevertebral soft tissue space is not widened.  There are degenerative changes present involving mainly the C4-C5 and C5-C6 level.  Findings consist of vertebral body osteophyte formation and some narrowing of the disc spaces.  No facet joint or foraminal abnormalities were identified.    Past Medical History:   Diagnosis Date    b Hypertension     8/3/15 RXd A Low Salt Diet     c Hypercholesterolemia With High HDL     9/25/18 RXd Lifestyle Changes    c Mild Hypertriglyceridemia     d Family H/O DM     f Obesity     This; 9/24/18 TFTs, JULIANA, And Cortisol = Unremarkable    f Osteopenia     10/19/18 RXd Fosamax 35 Mg Weekly, Continued Her OTC D3 5K IU daily, And RXd OTC CaCO3 1,200 Mg Daily; She Is Also On HRT    f Weight Gain ###    9/20/18 Likely Due To Rixulti And I Recommended She F/U With Her Psychiatrist For This; 9/24/18 TFTs, JULIANA, And Cortisol = Unremarkable    g Chronic Mild Iron Deficiency Anemia     1/31/20 RXd Ferrous Gluconate 324 Mg Daily; Likely Do To Post Bariatric Surgery  "Decreased Absorption    j Family H/O Colon Polyps     Dr. Justino Mckeon 12/14/15: "Repeat TC In 5 YRs"    j GERD     j H/O Bariatric Sleeve Gastrectomy In 2011 ###    5/2/15 Fe+ Studies And B12 = Normal    j H/O Laparoscopic Cholecystectomy On 10/7/19     Dr. Rc canela H/O Right Hemicolectomy 2014 For (A Benign) Appendix     Dr. Ilda canela Irritable Bowel Syndrome     Dr. Justino Mckeon    k H/O Left Breast Lumpectomy For Benign Mass In 2014     Dr. Ilda mcclain Postmenopausal On Chronic Hormone Replacement Therapy     l Bilateral Hand Tendonitis     5/2/15 RF, SARKIS, UA, CPK And ESR = Normal    l Fibromyalgia ###    6/3/19 (Phone Log) RXd Amitriptyline 50 Mg BID; 6/3/19 RXd Savella 25 Mg BID But This Was Too Expensive; 12/4/15 Referred To Dr. Wu Baird; Lyrica And Cymbalta And Neurontin Innefective; 5/2/15 RF, SARKIS, UA, CPK And ESR = Normal    l Left 3rd Finger Laceration 8/2/15     l Lumbar DDD With Chronic LBP     Dr. Tristan Banda Started Her On Lyrica    l Right Arthropathy Shoulder     5/8/20 Referred To Dr. Tavares otoole Facial And RUE Muscle Twitching     12/4/15 Referred To Dr. Wu Baird    n Anxiety And Depression ###    11/4/19 (Phone Log) RXd Xanax 0.25 Mg Daily PRN., [facility] Saw Her On 12/12/16 And 6/22/16 And 5/17/16     n Attention Deficit Hyperactivity Disorder ###    11/2015 She Tolerated Concerta But Stopped It; Aderrall And Vyvanse Caused Moodiness    n Bipolar Affective Disorder ###    [facility] Saw Her On 12/12/16 And 6/22/16 And 5/17/16     o Migraines     q Vitamin D Deficiency     9/25/18 RXd OTC D3 5K IU Daily    Wellness Visit 5/8/2020      Past Surgical History:   Procedure Laterality Date    APPENDECTOMY      BREAST BIOPSY      CHOLECYSTECTOMY  10/07/2019    COLON SURGERY      HYSTERECTOMY      LAPAROSCOPIC " CHOLECYSTECTOMY N/A 10/7/2019    Procedure: CHOLECYSTECTOMY, LAPAROSCOPIC;  Surgeon: Rc Rico MD;  Location: [facility];  Service: General;  Laterality: N/A;    STOMACH SURGERY      gastric sleeve     Social History     Socioeconomic History    Marital status:      Spouse name: Edilberto    Number of children: 3    Years of education: Not on file    Highest education level: Not on file   Occupational History    Not on file   Social Needs    Financial resource strain: Not on file    Food insecurity     Worry: Never true     Inability: Never true    Transportation needs     Medical: No     Non-medical: No   Tobacco Use    Smoking status: Never Smoker    Smokeless tobacco: Never Used   Substance and Sexual Activity    Alcohol use: Yes     Frequency: Never     Binge frequency: Never     Comment: occasionally    Drug use: Never    Sexual activity: Yes     Partners: Male     Birth control/protection: See Surgical Hx, None   Lifestyle    Physical activity     Days per week: 0 days     Minutes per session: 0 min    Stress: Very much   Relationships    Social connections     Talks on phone: More than three times a week     Gets together: Once a week     Attends Taoist service: Not on file     Active member of club or organization: Yes     Attends meetings of clubs or organizations: More than 4 times per year     Relationship status: Patient refused   Other Topics Concern    Not on file   Social History Narrative    Not on file     Family History   Problem Relation Age of Onset    Asthma Mother     Depression Mother     Hypertension Mother     Hyperlipidemia Mother     Anxiety disorder Mother     Thyroid disease Mother     Depression Father     Hypertension Father     Heart disease Father     Cancer Maternal Grandfather         LEUKEMIA       Review of patient's allergies indicates:   Allergen Reactions    Gabapentin Other (See Comments)     Confusion, brain fog and thomas    Iodinated  contrast media Shortness Of Breath     Itchy, nausea    Abilify [aripiprazole]     Blue dye     Elavil [amitriptyline]     Mirabegron     Morphine      Hallucinations      Red dye     Rexulti [brexpiprazole]     Tramadol Other (See Comments)     Feels drunk    Wellbutrin [bupropion hcl] Other (See Comments)     Depressed and couldn't get out of bed    Yellow dye        Current Outpatient Medications   Medication Sig    alendronate (FOSAMAX) 35 MG tablet Take 35 mg by mouth once a week.    clonazePAM (KLONOPIN) 0.5 MG tablet Take 0.5 mg by mouth once daily.    losartan (COZAAR) 100 MG tablet Take 100 mg by mouth once daily.    ondansetron (ZOFRAN-ODT) 4 MG TbDL Take 4 mg by mouth as needed.    sertraline (ZOLOFT) 50 MG tablet Take 100 mg by mouth once daily.    acetaminophen (TYLENOL) 325 MG tablet Take 2 tablets (650 mg total) by mouth every 8 (eight) hours as needed for Temperature greater than (or equal to 101 degree F).    alendronate (FOSAMAX) 35 MG tablet Take 1 tablet (35 mg total) by mouth every 7 days. (Patient taking differently: Take 35 mg by mouth Every Friday. )    ALPRAZolam (XANAX) 0.25 MG tablet Take 1-2 tablets (0.25-0.5 mg total) by mouth 2 (two) times daily as needed for Anxiety.    cetirizine (ZYRTEC) 10 MG tablet Take 10 mg by mouth once daily.    cholecalciferol, vitamin D3, (VITAMIN D3) 125 mcg (5,000 unit) Tab Take 1 tablet (5,000 Units total) by mouth once daily.    clonazePAM (KLONOPIN) 0.5 MG tablet     docusate sodium (COLACE) 100 MG capsule Take 1 capsule (100 mg total) by mouth 2 (two) times daily as needed for Constipation.    estradioL (ESTRACE) 1 MG tablet     Lactobacillus acidophilus (PROBIOTIC) 10 billion cell Cap Probiotic    lamoTRIgine (LAMICTAL) 25 MG tablet     losartan (COZAAR) 100 MG tablet Take 1 tablet by mouth once daily    ondansetron (ZOFRAN) 4 MG tablet Take 1 tablet (4 mg total) by mouth every 6 (six) hours as needed for Nausea.     "ondansetron (ZOFRAN-ODT) 4 MG TbDL Take 2 tablets (8 mg total) by mouth every 6 (six) hours as needed (nausea).    pantoprazole (PROTONIX) 40 MG tablet Take 1 tablet (40 mg total) by mouth 2 (two) times daily.    polyethylene glycol (GLYCOLAX) 17 gram PwPk Take 17 g by mouth once daily. (Patient not taking: Reported on 7/9/2020)    promethazine-dextromethorphan (PROMETHAZINE-DM) 6.25-15 mg/5 mL Syrp Take 6.25 mg by mouth as needed.    psyllium (METAMUCIL) powder Take 1 packet by mouth 3 (three) times daily.    tiZANidine (ZANAFLEX) 2 MG tablet Take 1-2 tablets (2-4 mg total) by mouth every 8 (eight) hours as needed.    traZODone (DESYREL) 100 MG tablet Take 400 mg by mouth every evening.     ziprasidone (GEODON) 20 MG Cap      Current Facility-Administered Medications   Medication    methylPREDNISolone acetate injection 40 mg       Review of Systems     GENERAL:  No weight loss, malaise or fevers.  HEENT:   No recent changes in vision or hearing  NECK:  Negative for lumps, no difficulty with swallowing.  RESPIRATORY:  Negative for cough, wheezing or shortness of breath, patient denies any recent URI.  CARDIOVASCULAR:  Negative for chest pain, leg swelling or palpitations.  GI:  Negative for abdominal discomfort, blood in stools or black stools or change in bowel habits.  MUSCULOSKELETAL:  See HPI.  SKIN:  Negative for lesions, rash, and itching.  PSYCH:  Positive for mood disorder and recent psychosocial stressors.  Patients sleep is not disturbed secondary to pain.  HEMATOLOGY/LYMPHOLOGY:  Negative for prolonged bleeding, bruising easily or swollen nodes.  Patient is not currently taking any anti-coagulants  NEURO:   No history of headaches, syncope, paralysis, seizures or tremors.  All other reviewed and negative other than HPI.    OBJECTIVE:    /72   Pulse 73   Temp 98.5 °F (36.9 °C)   Ht 5' 3" (1.6 m)   Wt 59.3 kg (130 lb 11.7 oz)   LMP  (LMP Unknown)   SpO2 98%   BMI 23.16 kg/m² "         Physical Exam    GENERAL: Well appearing, in no acute distress, alert and oriented x3.  PSYCH:  Tearful and stressed  SKIN: Skin color, texture, turgor normal, no rashes or lesions.  HEAD/FACE:  Normocephalic, atraumatic. Cranial nerves grossly intact.  NECK:  Moderate pain to palpation over the upper trapezius and cervical paraspinous muscles bilaterally. Spurling equivocal.  Mild-to-moderate pain with neck flexion, extension, and lateral flexion.   CV: RRR with palpation of the radial artery.  PULM: No evidence of respiratory difficulty, symmetric chest rise.  GI:  Soft and non-tender.  EXTREMITIES: Peripheral joint ROM is full and pain free without obvious instability or laxity in all four extremities. No deformities, edema, or skin discoloration. Good capillary refill.  MUSCULOSKELETAL:  Multiple tender points throughout including but not limited to cervical myofascial region, anterior shoulders, lateral and medial forearm , lower back, medial knee.   Bilateral upper and lower extremity strength is normal and symmetric.  No atrophy or tone abnormalities are noted.  NEURO: Bilateral upper and lower extremity coordination and muscle stretch reflexes are physiologic and symmetric.  Plantar response are downgoing. No clonus.  Negative Eddie.  No loss of sensation is noted.  GAIT: normal.      LABS:  Lab Results   Component Value Date    WBC 5.73 12/19/2019    HGB 11.0 (L) 12/19/2019    HCT 35.9 (L) 12/19/2019    MCV 78 (L) 12/19/2019     12/19/2019       CMP  Sodium   Date Value Ref Range Status   12/19/2019 141 136 - 145 mmol/L Final     Potassium   Date Value Ref Range Status   12/19/2019 4.3 3.5 - 5.1 mmol/L Final     Chloride   Date Value Ref Range Status   12/19/2019 106 95 - 110 mmol/L Final     CO2   Date Value Ref Range Status   12/19/2019 28 22 - 31 mmol/L Final     Glucose   Date Value Ref Range Status   12/19/2019 92 70 - 110 mg/dL Final     Comment:     The ADA recommends the following  guidelines for fasting glucose:  Normal:       less than 100 mg/dL  Prediabetes:  100 mg/dL to 125 mg/dL  Diabetes:     126 mg/dL or higher       BUN, Bld   Date Value Ref Range Status   12/19/2019 17 7 - 18 mg/dL Final     Creatinine   Date Value Ref Range Status   12/19/2019 0.74 0.50 - 1.40 mg/dL Final     Calcium   Date Value Ref Range Status   12/19/2019 9.6 8.4 - 10.2 mg/dL Final     Total Protein   Date Value Ref Range Status   12/19/2019 7.2 6.0 - 8.4 g/dL Final     Albumin   Date Value Ref Range Status   12/19/2019 4.3 3.5 - 5.2 g/dL Final     Total Bilirubin   Date Value Ref Range Status   12/19/2019 0.4 0.2 - 1.3 mg/dL Final     Alkaline Phosphatase   Date Value Ref Range Status   12/19/2019 51 38 - 145 U/L Final     AST   Date Value Ref Range Status   12/19/2019 19 14 - 36 U/L Final     ALT   Date Value Ref Range Status   12/19/2019 23 10 - 44 U/L Final     Anion Gap   Date Value Ref Range Status   12/19/2019 7 (L) 8 - 16 mmol/L Final     eGFR if    Date Value Ref Range Status   12/19/2019 >60 >60 mL/min/1.73 m^2 Final     eGFR if non    Date Value Ref Range Status   12/19/2019 >60 >60 mL/min/1.73 m^2 Final     Comment:     Calculation used to obtain the estimated glomerular filtration  rate (eGFR) is the CKD-EPI equation.          Lab Results   Component Value Date    HGBA1C 5.7 (H) 12/19/2019             ASSESSMENT: 49 y.o. year old female with neck pain, consistent with     1. Myalgia of auxiliary muscles, head and neck     2. Chronic myofascial pain     3. Fibromyalgia     4. Myofascial pain  Ambulatory referral/consult to Pain Clinic         PLAN:   - Interventions: Performed trigger point injections to the cervical myofascial region today for diagnostic and therapeutic purposes.. Explained the risks and benefits of the procedure in detail with the patient today in clinic along with alternative treatment options, and the patient elected to pursue the intervention at  this time.  Verbal consent obtained, see procedure note below for more details.    - Anticoagulation use: no     - Medications: I have stressed the importance of physical activity and a home exercise plan to help with pain and improve health., Patient can continue with medications for now since they are providing benefits, using them appropriately, and without side effects. and Start Zanaflex 2-4mg TID prn to help with muscular symptoms. Explained titration schedule with starting nightly and increase dose gradually to tolerance without adverse effects.  Also refill the patient's vitamin-D 3 5000 units daily.    - Therapy:  Advised patient to continue with activities and exercises as tolerated    - Psychological:  I believe most of this patient's pain related to psychogenic causes due to her increased level of stress related to her son's legal issues and ongoing depression/anxiety which appears to be complicating her pain treatment plan.  Strongly advised patient continue with her mental health care, counseling, and to continue medications as prescribed.  Discussed coping mechanisms to help address chronic pain issues.    - Labs:  Reviewed    - Imaging: Reviewed available imaging with patient and answered any questions they had regarding study.    - Consults/Referrals:  None at this time    - Records:  Reviewed/Obtain old records from outside physicians and imaging    - Follow up visit: return to clinic in 3 months or as needed    - Counseled patient regarding the importance of activity modification and physical therapy    - This condition does not require this patient to take time off of work, and the primary goal of our Pain Management services is to improve the patient's functional capacity.    - Patient Questions: Answered all of the patient's questions regarding diagnosis, therapy, and treatment    PROCEDURE NOTE:  Trigger Point Injection:   The procedure was discussed with the patient including complications of  nerve damage,  bleeding, infection, and failure of pain relief.   Trigger points were identified by palpation and marked. Alcohol swab prep of sites done. A mixture of 4mL 1% lidocaine + 40 mg Depo-Medrol  was prepared (5 mL total).   A 25-gauge needle was advanced to the point of maximal tenderness, and  1 to 1.25mL  was injected after negative aspiration. All sites done in the same manner. Patient tolerated the procedure well and without complications. Patient was monitored for 15 min following the injection before discharged from the clinic.  Sites injected included: cervical paraspinal and upper trapezius bilaterally      The above plan and management options were discussed at length with patient. Patient is in agreement with the above and verbalized understanding.    I discussed the goals of interventional chronic pain management with the patient on today's visit.  I explained the utility of injections for diagnostic and therapeutic purposes.  We discussed a multimodal approach to pain including treating the patient's given worst pain at any given time.  We will use a systematic approach to addressing pain.  We will also adopt a multimodal approach that includes injections, adjuvant medications, physical therapy, at times psychiatry.  There may be a limited role for opioid use intermittently in the treatment of pain, more particularly for acute pain although no one approach can be used as a sole treatment modality.    I emphasized the importance of regular exercise, core strengthening and stretching, diet and weight loss as a cornerstone of long-term pain management.      Souleymane Gee MD  Interventional Pain Management  [facility]    Disclaimer:  This note was prepared using voice recognition system and is likely to have sound alike errors that may have been overlooked even after proof reading.  Please call me with any questions

## 2020-08-04 ENCOUNTER — OFFICE VISIT (OUTPATIENT)
Dept: RHEUMATOLOGY | Facility: CLINIC | Age: 50
End: 2020-08-04
Payer: COMMERCIAL

## 2020-08-04 VITALS
TEMPERATURE: 98 F | WEIGHT: 132.13 LBS | DIASTOLIC BLOOD PRESSURE: 75 MMHG | BODY MASS INDEX: 23.4 KG/M2 | SYSTOLIC BLOOD PRESSURE: 109 MMHG

## 2020-08-04 DIAGNOSIS — M15.9 OSTEOARTHRITIS OF MULTIPLE JOINTS, UNSPECIFIED OSTEOARTHRITIS TYPE: Primary | ICD-10-CM

## 2020-08-04 DIAGNOSIS — M79.7 FIBROMYALGIA: ICD-10-CM

## 2020-08-04 PROCEDURE — 99203 PR OFFICE/OUTPT VISIT, NEW, LEVL III, 30-44 MIN: ICD-10-PCS | Mod: S$GLB,,, | Performed by: INTERNAL MEDICINE

## 2020-08-04 PROCEDURE — 99203 OFFICE O/P NEW LOW 30 MIN: CPT | Mod: S$GLB,,, | Performed by: INTERNAL MEDICINE

## 2020-08-04 RX ORDER — DIFLUNISAL 500 MG/1
TABLET, FILM COATED ORAL
Qty: 270 TABLET | Refills: 0 | Status: SHIPPED | OUTPATIENT
Start: 2020-08-04 | End: 2020-08-06

## 2020-08-04 NOTE — LETTER
August 4, 2020      Tae Mccullough MD  [address]  [address]  [place] [address]           [facility] - Rheumatology  [address]  [address]  [place] [address]  Phone: [phone]  Fax: [phone]          Patient: Helen Pickard   MR Number: [id number]   YOB: 1970   Date of Visit: 8/4/2020       Dear Dr. Tae Mccullough:    Thank you for referring Helen Pickard to me for evaluation. Attached you will find relevant portions of my assessment and plan of care.    If you have questions, please do not hesitate to call me. I look forward to following Helen Pickard along with you.    Sincerely,    Yuliya Kenney MD    Enclosure  CC:  No Recipients    If you would like to receive this communication electronically, please contact [email or web address] or [phone] to request more information on [employer] Link access.    For providers and/or their staff who would like to refer a patient to [facility], please contact us through our one-stop-shop provider referral line, [facility], at [phone].    If you feel you have received this communication in error or would no longer like to receive these types of communications, please e-mail [email or web address]

## 2020-08-04 NOTE — PROGRESS NOTES
[facility] RHEUMATOLOGY        NEW PATIENT      Subjective:       Patient ID:   NAME: Helen Pickard : 1970     49 y.o. female    Referring Doc: Tae Mccullough MD  Other Physicians:    Chief Complaint:  Fibromyalgia      History of Present Illness:     New patient with hx of FM for 25 yrs  Lost 60 # from Oct to March after gallbladder surgery in October.  Has fatigue and chronic musculoskeletal pain.  No joint swelling.  No rashes or mucosal ulcerations.  Had episode of raised malar lesions 2 wks ago.  No history of photosensitivity or Raynaud's no history of serositis  + sicca sxs for years      ROS:   GEN: no fevers night sweats + significant weight changes  +  fatigue  HEENT: + hx migraine and tension  HA's, changes in vision , no mouth ulcers, no sicca symptoms, no scalp tenderness, jaw claudication  CV: no CP, SOB, PND, CRUZ or orthopnea,no palpitations  PULM:no SOB, cough, hemoptysis, sputum or pleuritic pain  GI: no abdominal pain, nausea, vomiting, constipation alternating withdiarrhea, melanotic stools, BRBPR, or hematemesis, no dysphagia  : no hematuria, dysuria  NEURO:+ paresthesias in arms and legs for years.,+ headaches, visual disturbances, muscle weakness  SKIN:  no rashes , erythema, bruising, or swelling, no Raynauds, no photosensitivity  MUSCULOSKELETAL:+ joint swelling PIP's , no prolonged AM stiffness,+ occ back pain   PSYCH:  +  Insomnia,   +depression,  anxiety  No hx thromboembolic events or miscarriages  Medications:    Current Outpatient Medications:     acetaminophen (TYLENOL) 325 MG tablet, Take 2 tablets (650 mg total) by mouth every 8 (eight) hours as needed for Temperature greater than (or equal to 101 degree F)., Disp: , Rfl: 0    alendronate (FOSAMAX) 35 MG tablet, Take 35 mg by mouth once a week. Every Friday, Disp: , Rfl:     ALPRAZolam (XANAX) 0.25 MG tablet, Take 1-2 tablets (0.25-0.5 mg total) by mouth 2 (two) times daily as needed for Anxiety., Disp: 30  tablet, Rfl: 0    cetirizine (ZYRTEC) 10 MG tablet, Take 10 mg by mouth once daily., Disp: , Rfl:     cholecalciferol, vitamin D3, (VITAMIN D3) 125 mcg (5,000 unit) Tab, Take 1 tablet (5,000 Units total) by mouth once daily., Disp: 90 tablet, Rfl: 3    clonazePAM (KLONOPIN) 0.5 MG tablet, , Disp: , Rfl:     clonazePAM (KLONOPIN) 0.5 MG tablet, Take 0.5 mg by mouth once daily., Disp: , Rfl:     docusate sodium (COLACE) 100 MG capsule, Take 1 capsule (100 mg total) by mouth 2 (two) times daily as needed for Constipation., Disp: , Rfl: 0    estradioL (ESTRACE) 1 MG tablet, , Disp: , Rfl:     Lactobacillus acidophilus (PROBIOTIC) 10 billion cell Cap, Probiotic, Disp: , Rfl:     losartan (COZAAR) 100 MG tablet, Take 100 mg by mouth once daily., Disp: , Rfl:     ondansetron (ZOFRAN-ODT) 4 MG TbDL, Take 4 mg by mouth as needed., Disp: , Rfl:     pantoprazole (PROTONIX) 40 MG tablet, Take 1 tablet (40 mg total) by mouth 2 (two) times daily., Disp: 180 tablet, Rfl: 1    polyethylene glycol (GLYCOLAX) 17 gram PwPk, Take 17 g by mouth once daily., Disp: 20 each, Rfl: 0    promethazine-dextromethorphan (PROMETHAZINE-DM) 6.25-15 mg/5 mL Syrp, Take 6.25 mg by mouth as needed., Disp: , Rfl:     psyllium (METAMUCIL) powder, Take 1 packet by mouth 3 (three) times daily., Disp: , Rfl:     sertraline (ZOLOFT) 50 MG tablet, Take 100 mg by mouth once daily., Disp: , Rfl:     traZODone (DESYREL) 100 MG tablet, Take 400 mg by mouth every evening. , Disp: , Rfl:     ziprasidone (GEODON) 40 MG Cap, 40 mg 2 (two) times daily. , Disp: , Rfl:     tiZANidine (ZANAFLEX) 2 MG tablet, Take 1-2 tablets (2-4 mg total) by mouth every 8 (eight) hours as needed. (Patient not taking: Reported on 8/4/2020), Disp: 60 tablet, Rfl: 2  FAMILY HISTORY: negative for Connective Tissue Disease except for Rheumatoid arthritis: mother,aunts    PAST MEDICAL HISTORY:  Osteoporosis: on Fosamax for 2 yrs  HTN  IBS  Depression  Restless Leg  PAST  SURGICAL HISTORY:  Cholecystectomy Oct 2019  abd surgery years ago for obstruction  Gastric sleeve 10-20 years ago  Hysterectomy and oophorectomy  SOCIAL HISTORY:  Work: was hairdresser  Smoking : Never  ETOH : no  ALLERGIES:  See list        Objective:     Vitals:  Blood pressure 109/75, temperature 98.3 °F (36.8 °C), weight 59.9 kg (132 lb 1.6 oz).    Physical Examination:   GEN: no apparent distress, comfortable; AAOx3  SKIN: no rashes, no lesions, no sclerodactyly or induration, no Raynaud's, no periungual erythema  HEAD: normal  EYES: no pallor, no icterus,  ENT:  Sl dry oral mucosa NECK: no masses, thyroid normal, trachea midline, no LAD/LN's, supple  CV:   S1 and S2 regular, no murmurs, gallop or rubs  CHEST: Normal respiratory effort;  normal breath sounds; no rubs, no wheezes, no crackles.   ABDOM: nontender and nondistended; soft; ; no rebound/guarding,no masses  MUSC/Skeletal: ROM normal; no crepitus; joints without synovitis, + prominent R sternoclavicular joint deformities .Widespread trigger points.  Slight tenderness in some PIP joints and MCP's without swelling.  EXTREM: no clubbing, cyanosis, edema, normal pulses.  NEURO: grossly intact; motorWNL; AAOx3; no tremors  PSYCH: normal mood, affect and behavior  LYMPH: normal cervical, supraclavicular            Labs:   @RESUFAST(WBC,HGB,HCT,MCV,PLT)  )@RESUFAST(NA,K,CL,CO2,GLU,BUN,Creatinine,Calcium,PROT,Albumin,Bilitot,Alkphos,AST,ALT,SARKIS,Sed Rate,CRP,RF,CCP)      Radiology/Diagnostic Studies:    I have reviewed all available labs and XRay reports    Assessment/Plan:   49 y.o. female with osteoarthritis ,RO inflammatory  Fibromyalgia  Sicca sxs  Osteopenia  PLAN:    Labs including SARKIS, SSA ,rheumatoid factor, CCP antibody, inflammatory markers etcetera  DC ibuprofen and other over-the-counter NSAID's  Dolobid 500 mg po bid-tid pc prn  Discussion:     I have explained all of the above in detail and the patient understands all of the current  recommendation(s). I have answered all of their questions to the best of my ability and to their complete satisfaction.      I have reviewed the risks and benefits of the medication in detail with patient, who understands and wishes to proceed. Printed information regarding the disease and/or medication was also provided.        RTC a few wks        Electronically signed by Yuliya Kenney MD

## 2020-08-05 ENCOUNTER — TELEPHONE (OUTPATIENT)
Dept: PAIN MEDICINE | Facility: CLINIC | Age: 50
End: 2020-08-05

## 2020-08-05 RX ORDER — METHOCARBAMOL 500 MG/1
500 TABLET, FILM COATED ORAL 2 TIMES DAILY PRN
Qty: 60 TABLET | Refills: 2 | Status: SHIPPED | OUTPATIENT
Start: 2020-08-05 | End: 2020-11-03

## 2020-08-05 NOTE — TELEPHONE ENCOUNTER
----- Message from Gildardo Khan sent at 8/5/2020 10:23 AM CDT -----  ..Type:  Patient Returning Call    Who Called: pt   Who Left Message for Patient:  Does the patient know what this is regarding?: medication change   Would the patient rather a call back or a response via [facility]?  Call back   Best Call Back Number: [phone]  Additional Information:Pt states she cant to zanaflex and would like to discuss a different medication.

## 2020-08-05 NOTE — TELEPHONE ENCOUNTER
Pt interested in robaxin , please send to  [facility] [address] [place] - [address]

## 2020-08-05 NOTE — TELEPHONE ENCOUNTER
Zanaflex makes pt aggressive and wants to try different medication . Pt asked about medical marijuana, gave pt information for  office , as  will not provide . Did inform pt I would discuss with  about another medication and be back in contact with pt regarding what he suggest . Pt understood. All questions answered.   Ronan Tomas MA  [facility]

## 2020-08-06 ENCOUNTER — TELEPHONE (OUTPATIENT)
Dept: RHEUMATOLOGY | Facility: CLINIC | Age: 50
End: 2020-08-06

## 2020-08-06 RX ORDER — MELOXICAM 7.5 MG/1
TABLET ORAL
Qty: 60 TABLET | Refills: 1 | Status: SHIPPED | OUTPATIENT
Start: 2020-08-06 | End: 2021-03-29

## 2020-08-06 NOTE — TELEPHONE ENCOUNTER
Insurance won't cover diflunisal. They will cover meloxicam.   Patient request a rx sent to her pharmacy

## 2020-08-06 NOTE — TELEPHONE ENCOUNTER
Tried to call to inform robaxin was sent. No answer. Left   Alexis Boyd, MA [facility]   [facility]

## 2020-08-25 ENCOUNTER — OFFICE VISIT (OUTPATIENT)
Dept: PHYSICAL MEDICINE AND REHAB | Facility: CLINIC | Age: 50
End: 2020-08-25
Payer: COMMERCIAL

## 2020-08-25 VITALS — BODY MASS INDEX: 23.43 KG/M2 | HEIGHT: 63 IN | WEIGHT: 132.25 LBS

## 2020-08-25 DIAGNOSIS — M67.921 TENDINOPATHY OF RIGHT BICEPS TENDON: ICD-10-CM

## 2020-08-25 DIAGNOSIS — M75.41 ROTATOR CUFF IMPINGEMENT SYNDROME OF RIGHT SHOULDER: ICD-10-CM

## 2020-08-25 PROCEDURE — 20550 NJX 1 TENDON SHEATH/LIGAMENT: CPT | Mod: RT,S$GLB,, | Performed by: PHYSICAL MEDICINE & REHABILITATION

## 2020-08-25 PROCEDURE — 76942 TENDON SHEATH: ICD-10-PCS | Mod: S$GLB,,, | Performed by: PHYSICAL MEDICINE & REHABILITATION

## 2020-08-25 PROCEDURE — 99999 PR PBB SHADOW E&M-EST. PATIENT-LVL IV: CPT | Mod: PBBFAC,,, | Performed by: PHYSICAL MEDICINE & REHABILITATION

## 2020-08-25 PROCEDURE — 99999 PR PBB SHADOW E&M-EST. PATIENT-LVL IV: ICD-10-PCS | Mod: PBBFAC,,, | Performed by: PHYSICAL MEDICINE & REHABILITATION

## 2020-08-25 PROCEDURE — 20550 TENDON SHEATH: ICD-10-PCS | Mod: RT,S$GLB,, | Performed by: PHYSICAL MEDICINE & REHABILITATION

## 2020-08-25 PROCEDURE — 99213 PR OFFICE/OUTPT VISIT, EST, LEVL III, 20-29 MIN: ICD-10-PCS | Mod: 25,S$GLB,, | Performed by: PHYSICAL MEDICINE & REHABILITATION

## 2020-08-25 PROCEDURE — 99213 OFFICE O/P EST LOW 20 MIN: CPT | Mod: 25,S$GLB,, | Performed by: PHYSICAL MEDICINE & REHABILITATION

## 2020-08-25 PROCEDURE — 76942 ECHO GUIDE FOR BIOPSY: CPT | Mod: S$GLB,,, | Performed by: PHYSICAL MEDICINE & REHABILITATION

## 2020-08-25 RX ORDER — BETAMETHASONE SODIUM PHOSPHATE AND BETAMETHASONE ACETATE 3; 3 MG/ML; MG/ML
1.5 INJECTION, SUSPENSION INTRA-ARTICULAR; INTRALESIONAL; INTRAMUSCULAR; SOFT TISSUE
Status: DISCONTINUED | OUTPATIENT
Start: 2020-08-25 | End: 2020-08-25 | Stop reason: HOSPADM

## 2020-08-25 RX ADMIN — BETAMETHASONE SODIUM PHOSPHATE AND BETAMETHASONE ACETATE 1.5 MG: 3; 3 INJECTION, SUSPENSION INTRA-ARTICULAR; INTRALESIONAL; INTRAMUSCULAR; SOFT TISSUE at 09:08

## 2020-08-25 NOTE — LETTER
August 25, 2020      [place]  [address] [address] [facility]  [facility] [address]           [facility]  [address] [facility] [address]  Phone: [phone]  Fax: [phone]          Patient: Helen Pickard   MR Number: [id number]   YOB: 1970   Date of Visit: 8/25/2020       Dear Dr. Tavares Newsome II:    Thank you for referring Helen Pickard to me for evaluation. Attached you will find relevant portions of my assessment and plan of care.    If you have questions, please do not hesitate to call me. I look forward to following Helen Pickard along with you.    Sincerely,    Og Garcia MD    Enclosure  CC:  No Recipients    If you would like to receive this communication electronically, please contact [email or web address] or [phone] to request more information on [employer] Link access.    For providers and/or their staff who would like to refer a patient to [facility], please contact us through our one-stop-shop provider referral line, [facility] [place], at [phone].    If you feel you have received this communication in error or would no longer like to receive these types of communications, please e-mail [email or web address]

## 2020-08-25 NOTE — PROGRESS NOTES
[facility]    Consulting Provider: Tavares Newsome II, *    CHIEF COMPLAINT:  Right shoulder pain    HISTORY OF PRESENT ILLNESS: Helen Pickard is a 49 y.o. female who presents to me for evaluation of right shoulder pain. Patient being seen for possible injection of right biceps tendon sheath after being referred by Dr. Newsome. Patient has 4-5 months of right shoulder burning/throbbing pain mainly located over the deltoid and sharp pain with movement. She describes clicking in the shoulder every once in a while. Also has some difficulty with sleep due to pain. Patient has gotten MRI which showed: Supraspinatus and infraspinatus tendinopathy without evidence of high-grade tear, subscapularis tendinopathy with partial-thickness tearing demonstrated of distal tendon fibers, suspected superior labral tear, slap type, biceps tendinopathy and mild AC and glenohumeral joint osteoarthritis.    She has received shoulder injections from Dr. Maya and Dr. Newsome, last in June 29, 2020 with minimal relief. Also received trigger point injections from Dr. Gee in July 2020 which provided some relief. She has been referred to physical therapy but has not gone yet due to none being close to her home. She says she will look into options closer to her today. Dr. Newsome does not feel surgery is necessary for patient at this time.    Review of Systems   Constitutional: Negative for fever.   HENT: Negative for drooling.    Eyes: Negative for discharge.   Respiratory: Negative for choking.    Cardiovascular: Negative for chest pain.   Genitourinary: Negative for flank pain.   Skin: Negative for wound.   Allergic/Immunologic: Negative for immunocompromised state.   Neurological: Negative for tremors and syncope.   Psychiatric/Behavioral: Negative for behavioral problems.     Past Medical History:   Past Medical History:   Diagnosis Date    b Hypertension     8/3/15 RXd A Low Salt Diet     c  "Hypercholesterolemia With High HDL     9/25/18 RXd Lifestyle Changes    c Mild Hypertriglyceridemia     d Family H/O DM     f Obesity     This; 9/24/18 TFTs, JULIANA, And Cortisol = Unremarkable    f Osteopenia     10/19/18 RXd Fosamax 35 Mg Weekly, Continued Her OTC D3 5K IU daily, And RXd OTC CaCO3 1,200 Mg Daily; She Is Also On HRT    f Weight Gain ###    9/20/18 Likely Due To Rixulti And I Recommended She F/U With Her Psychiatrist For This; 9/24/18 TFTs, JULIANA, And Cortisol = Unremarkable    g Chronic Mild Iron Deficiency Anemia     1/31/20 RXd Ferrous Gluconate 324 Mg Daily; Likely Do To Post Bariatric Surgery Decreased Absorption    j Family H/O Colon Polyps     Dr. Justino Mckeon 12/14/15: "Repeat TC In 5 YRs"    j GERD     j H/O Bariatric Sleeve Gastrectomy In 2011 ###    5/2/15 Fe+ Studies And B12 = Normal    j H/O Laparoscopic Cholecystectomy On 10/7/19     Dr. Rc canela H/O Right Hemicolectomy 2014 For (A Benign) Appendix     Dr. Ilda canela Irritable Bowel Syndrome     Dr. Justino Mckeon    k H/O Left Breast Lumpectomy For Benign Mass In 2014     Dr. Ilda mcclain Postmenopausal On Chronic Hormone Replacement Therapy     l Bilateral Hand Tendonitis     5/2/15 RF, SARKIS, UA, CPK And ESR = Normal    l Fibromyalgia ###    6/3/19 (Phone Log) RXd Amitriptyline 50 Mg BID; 6/3/19 RXd Savella 25 Mg BID But This Was Too Expensive; 12/4/15 Referred To Dr. Wu Baird; Lyrica And Cymbalta And Neurontin Innefective; 5/2/15 RF, SARKIS, UA, CPK And ESR = Normal    l Left 3rd Finger Laceration 8/2/15     l Lumbar DDD With Chronic LBP     Dr. Tristan Banda Started Her On Lyrica    l Right Arthropathy Shoulder     5/8/20 Referred To Dr. Tavares otoole Facial And RUE Muscle Twitching     12/4/15 Referred To Dr. Wu Baird    n Anxiety And Depression ###    11/4/19 (Phone Log) RXd Xanax 0.25 Mg Daily PRN., Dr. Daisy Cook (Her [facility]) Saw Her On " 12/12/16 And 6/22/16 And 5/17/16     n Attention Deficit Hyperactivity Disorder ###    11/2015 She Tolerated Concerta But Stopped It; Aderrall And Vyvanse Caused Moodiness    n Bipolar Affective Disorder ###    Dr. Daisy Cook (Her [facility] Psych[facility]) Saw Her On 12/12/16 And 6/22/16 And 5/17/16     o Migraines     q Vitamin D Deficiency     9/25/18 RXd OTC D3 5K IU Daily    Wellness Visit 5/8/2020        Past Surgical History:   Past Surgical History:   Procedure Laterality Date    APPENDECTOMY      BREAST BIOPSY      CHOLECYSTECTOMY  10/07/2019    COLON SURGERY      HYSTERECTOMY      LAPAROSCOPIC CHOLECYSTECTOMY N/A 10/7/2019    Procedure: CHOLECYSTECTOMY, LAPAROSCOPIC;  Surgeon: Rc Rico MD;  Location: [facility];  Service: General;  Laterality: N/A;    STOMACH SURGERY      gastric sleeve       Family History:   Family History   Problem Relation Age of Onset    Asthma Mother     Depression Mother     Hypertension Mother     Hyperlipidemia Mother     Anxiety disorder Mother     Thyroid disease Mother     Depression Father     Hypertension Father     Heart disease Father     Cancer Maternal Grandfather         LEUKEMIA       Medications:   Current Outpatient Medications on File Prior to Visit   Medication Sig Dispense Refill    acetaminophen (TYLENOL) 325 MG tablet Take 2 tablets (650 mg total) by mouth every 8 (eight) hours as needed for Temperature greater than (or equal to 101 degree F).  0    alendronate (FOSAMAX) 35 MG tablet Take 35 mg by mouth once a week. Every Friday      ALPRAZolam (XANAX) 0.25 MG tablet Take 1-2 tablets (0.25-0.5 mg total) by mouth 2 (two) times daily as needed for Anxiety. 30 tablet 0    cetirizine (ZYRTEC) 10 MG tablet Take 10 mg by mouth once daily.      cholecalciferol, vitamin D3, (VITAMIN D3) 125 mcg (5,000 unit) Tab Take 1 tablet (5,000 Units total) by mouth once daily. 90 tablet 3    clonazePAM (KLONOPIN) 0.5 MG tablet        clonazePAM (KLONOPIN) 0.5 MG tablet Take 0.5 mg by mouth once daily.      docusate sodium (COLACE) 100 MG capsule Take 1 capsule (100 mg total) by mouth 2 (two) times daily as needed for Constipation.  0    estradioL (ESTRACE) 1 MG tablet       ferrous gluconate (FERGON) 324 MG tablet Take 1 tablet by mouth once daily.      Lactobacillus acidophilus (PROBIOTIC) 10 billion cell Cap Probiotic      losartan (COZAAR) 100 MG tablet Take 100 mg by mouth once daily.      meloxicam (MOBIC) 7.5 MG tablet One po bid pc prn 60 tablet 1    methocarbamoL (ROBAXIN) 500 MG Tab Take 1 tablet (500 mg total) by mouth 2 (two) times daily as needed (muscle spasms). 60 tablet 2    ondansetron (ZOFRAN-ODT) 4 MG TbDL Take 4 mg by mouth as needed.      pantoprazole (PROTONIX) 40 MG tablet Take 1 tablet (40 mg total) by mouth 2 (two) times daily. 180 tablet 1    polyethylene glycol (GLYCOLAX) 17 gram PwPk Take 17 g by mouth once daily. 20 each 0    promethazine-dextromethorphan (PROMETHAZINE-DM) 6.25-15 mg/5 mL Syrp Take 6.25 mg by mouth as needed.      psyllium (METAMUCIL) powder Take 1 packet by mouth 3 (three) times daily.      sertraline (ZOLOFT) 100 MG tablet Take 200 mg by mouth once daily.      traZODone (DESYREL) 100 MG tablet Take 400 mg by mouth every evening.       ziprasidone (GEODON) 40 MG Cap 40 mg 2 (two) times daily.        No current facility-administered medications on file prior to visit.        Allergies:   Review of patient's allergies indicates:   Allergen Reactions    Gabapentin Other (See Comments)     Confusion, brain fog and thomas    Iodinated contrast media Shortness Of Breath     Itchy, nausea    Abilify [aripiprazole]     Blue dye     Elavil [amitriptyline]     Mirabegron     Morphine      Hallucinations      Red dye     Rexulti [brexpiprazole]     Tramadol Other (See Comments)     Feels drunk    Wellbutrin [bupropion hcl] Other (See Comments)     Depressed and couldn't get out of bed  "   Yellow dye        Social History:   Social History     Socioeconomic History    Marital status:      Spouse name: Edilberto    Number of children: 3    Years of education: Not on file    Highest education level: Not on file   Occupational History    Not on file   Social Needs    Financial resource strain: Not on file    Food insecurity     Worry: Never true     Inability: Never true    Transportation needs     Medical: No     Non-medical: No   Tobacco Use    Smoking status: Never Smoker    Smokeless tobacco: Never Used   Substance and Sexual Activity    Alcohol use: Yes     Frequency: Never     Binge frequency: Never     Comment: occasionally    Drug use: Never    Sexual activity: Yes     Partners: Male     Birth control/protection: See Surgical Hx, None   Lifestyle    Physical activity     Days per week: 0 days     Minutes per session: 0 min    Stress: Very much   Relationships    Social connections     Talks on phone: More than three times a week     Gets together: Once a week     Attends Mu-ism service: Not on file     Active member of club or organization: Yes     Attends meetings of clubs or organizations: More than 4 times per year     Relationship status: Patient refused   Other Topics Concern    Not on file   Social History Narrative    Not on file     Helen is a     PHYSICAL EXAMINATION:   General  Ht 5' 3" (1.6 m)   Wt 60 kg (132 lb 4.4 oz)   LMP  (LMP Unknown)   BMI 23.43 kg/m²   Constitutional: Oriented to person, place, and time. No apparent distress. Pleasant.  HENT:   Head: Normocephalic and atraumatic.   Eyes: Right eye exhibits no discharge. Left eye exhibits no discharge. No scleral icterus.   Pulmonary/Chest: Effort normal. No respiratory distress.   Abdominal: There is no guarding.   Neurological: Alert and oriented to person, place, and time.   Psychiatric: Behavior is normal.   Right Shoulder Exam     Tenderness   The patient is experiencing " tenderness in the biceps tendon (Tenderness of anterior and posterior shoulder girdle.).    Range of Motion   Right shoulder active abduction: 95.   External rotation: 60   Right shoulder internal rotation 90 degrees: 65.     Muscle Strength   Right shoulder normal muscle strength: Complaints of pain with resisted movements about the right shoulder.  Abduction: 4/5   Internal rotation: 5/5   External rotation: 4/5   Biceps: 4/5     Tests   Miller test: positive  Cross arm: negative  Impingement: positive  Drop arm: negative  Sulcus: absent    Other   Erythema: absent  Scars: absent  Sensation: normal  Pulse: present    Comments:  Neer's positive  Speed's Negative  Yergason's equivocal        INSPECTION: There is no swelling, ecchymoses, erythema or gross deformity about the right shoulder.    Imaging  MRI (6/22/20):  Impression:     1. Supraspinatus and infraspinatus tendinopathy without evidence of high-grade tear.  2. Subscapularis tendinopathy with partial-thickness tearing demonstrated of distal tendon fibers.  3. Suspected superior labral tear, slap type.  4. Mild AC and glenohumeral joint osteoarthritis.    Data Reviewed:  MRI    Supportive Actions: Independent visualization of images or test specimens    ASSESSMENT:   1. Rotator cuff impingement syndrome of right shoulder    2. Superior glenoid labrum lesion of right shoulder, subsequent encounter      PLAN:     1. Ms. Pickard has tendinopathy of supraspinatus, infraspinatus, subscapularus, and biceps tendon of the right shoulder seen on MRI.  Believe it is reasonable to attempt ultrasound-guided injection of corticosteroid to the right biceps tendon sheath for both diagnostic and therapeutic purposes, as requested by her orthopedic surgeon, Dr. Tran. We discussed risks and benefits of CSI of biceps tendon sheath. Patient was agreeable and CSI of right biceps tendon sheath was performed under ultrasound guidance. See procedure note.    2. Also  "discussed with patient need to attend physical therapy sessions as it can help improve her pain and range of motion of the right shoulder. Patient will look into physical therapy that is near her home.    3.  We discussed returning to see Dr. Newsome in the next 4-6 weeks if today's injection proved unsuccessful.    This is a consult from Dr. Tavares Newsome II. Please see the "Communications" section of Epic to see how the consulting physician received the report of today's findings and recommendations. If it's an [facility] physician, it will be forwarded to his/her "in basket".    The above note was completed, in part, with the aid of Dragon dictation software/hardware. Translation errors may be present.    "

## 2020-08-25 NOTE — PROCEDURES
Tendon Sheath    Date/Time: 8/25/2020 9:30 AM  Performed by: Og Garcia MD  Authorized by: Og Garcia MD     Consent Done?:  Yes (Verbal)  Indications:  Pain  Site marked: the procedure site was marked    Timeout: prior to procedure the correct patient, procedure, and site was verified    Prep: patient was prepped and draped in usual sterile fashion      Local anesthesia used?: No    Location:  Shoulder  Site:  R bicep tendon  Ultrasonic guidance for needle placement?: Yes    Needle size:  25 G  Approach: Needle in plane, lateral to medial.  Medications:  1.5 mg betamethasone acetate-betamethasone sodium phosphate 6 mg/mL  Patient tolerance:  Patient tolerated the procedure well with no immediate complications    Additional Comments: Ultrasound guidance was used for correct needle placement, the images were saved will be uploaded to EMR.

## 2020-08-31 ENCOUNTER — OFFICE VISIT (OUTPATIENT)
Dept: RHEUMATOLOGY | Facility: CLINIC | Age: 50
End: 2020-08-31
Payer: COMMERCIAL

## 2020-08-31 VITALS
DIASTOLIC BLOOD PRESSURE: 68 MMHG | WEIGHT: 133.88 LBS | TEMPERATURE: 98 F | SYSTOLIC BLOOD PRESSURE: 108 MMHG | BODY MASS INDEX: 23.72 KG/M2

## 2020-08-31 DIAGNOSIS — M15.9 OSTEOARTHRITIS OF MULTIPLE JOINTS, UNSPECIFIED OSTEOARTHRITIS TYPE: Primary | ICD-10-CM

## 2020-08-31 PROCEDURE — 99213 PR OFFICE/OUTPT VISIT, EST, LEVL III, 20-29 MIN: ICD-10-PCS | Mod: S$GLB,,, | Performed by: INTERNAL MEDICINE

## 2020-08-31 PROCEDURE — 99213 OFFICE O/P EST LOW 20 MIN: CPT | Mod: S$GLB,,, | Performed by: INTERNAL MEDICINE

## 2020-08-31 RX ORDER — TRAMADOL HYDROCHLORIDE 50 MG/1
50 TABLET ORAL EVERY 6 HOURS
Qty: 60 TABLET | Refills: 2 | Status: SHIPPED | OUTPATIENT
Start: 2020-08-31 | End: 2021-05-13

## 2020-08-31 NOTE — PROGRESS NOTES
[facility] RHEUMATOLOGY            PROGRESS NOTE      Subjective:       Patient ID:   NAME: Helen Pickard : 1970     49 y.o. female    Referring Doc: No ref. provider found  Other Physicians:    Chief Complaint:  Osteoarthritis      History of Present Illness:     Patient returns today for a regularly scheduled follow-up visit for  osteoarthritis and fibromyalgia   The patient has diffuse aches and pains.  No fevers cough or shortness of breath.  No chest pains.  Following CDC guidelines to avoid COVID-19 infection.  No known exposure to COVID-19            ROS:   GEN:  No  fever, night sweats . weight is stable   + fatigue  SKIN: no rashes, no bruising, no ulcerations, no Raynaud's  HEENT: no HA's, No visual changes, no mucosal ulcers, no sicca symptoms,  CV:   no CP, SOB, PND, CRUZ, no orthopnea, no palpitations  PULM: normal with no SOB, cough, hemoptysis, sputum or pleuritic pain  GI:  no abdominal pain, nausea, vomiting, constipation, diarrhea, melanotic stools, BRBPR, hematemesis, no dysphagia  :   no dysuria  NEURO: no paresthesias, headaches, visual disturbances, muscle weakness  MUSCULOSKELETAL:no joint swelling, prolonged AM stiffness, no back pain, + muscle pain  Allergies:  Review of patient's allergies indicates:   Allergen Reactions    Gabapentin Other (See Comments)     Confusion, brain fog and thomas    Iodinated contrast media Shortness Of Breath     Itchy, nausea    Abilify [aripiprazole]     Blue dye     Elavil [amitriptyline]     Mirabegron     Morphine      Hallucinations      Red dye     Rexulti [brexpiprazole]     Tramadol Other (See Comments)     Feels drunk    Wellbutrin [bupropion hcl] Other (See Comments)     Depressed and couldn't get out of bed    Yellow dye        Medications:    Current Outpatient Medications:     acetaminophen (TYLENOL) 325 MG tablet, Take 2 tablets (650 mg total) by mouth every 8 (eight) hours as needed for Temperature greater than (or  equal to 101 degree F)., Disp: , Rfl: 0    alendronate (FOSAMAX) 35 MG tablet, Take 35 mg by mouth once a week. Every Friday, Disp: , Rfl:     ALPRAZolam (XANAX) 0.25 MG tablet, Take 1-2 tablets (0.25-0.5 mg total) by mouth 2 (two) times daily as needed for Anxiety., Disp: 30 tablet, Rfl: 0    cetirizine (ZYRTEC) 10 MG tablet, Take 10 mg by mouth once daily., Disp: , Rfl:     cholecalciferol, vitamin D3, (VITAMIN D3) 125 mcg (5,000 unit) Tab, Take 1 tablet (5,000 Units total) by mouth once daily., Disp: 90 tablet, Rfl: 3    clonazePAM (KLONOPIN) 0.5 MG tablet, , Disp: , Rfl:     clonazePAM (KLONOPIN) 0.5 MG tablet, Take 0.5 mg by mouth once daily., Disp: , Rfl:     docusate sodium (COLACE) 100 MG capsule, Take 1 capsule (100 mg total) by mouth 2 (two) times daily as needed for Constipation., Disp: , Rfl: 0    estradioL (ESTRACE) 1 MG tablet, , Disp: , Rfl:     ferrous gluconate (FERGON) 324 MG tablet, Take 1 tablet by mouth once daily., Disp: , Rfl:     Lactobacillus acidophilus (PROBIOTIC) 10 billion cell Cap, Probiotic, Disp: , Rfl:     losartan (COZAAR) 100 MG tablet, Take 100 mg by mouth once daily., Disp: , Rfl:     meloxicam (MOBIC) 7.5 MG tablet, One po bid pc prn, Disp: 60 tablet, Rfl: 1    methocarbamoL (ROBAXIN) 500 MG Tab, Take 1 tablet (500 mg total) by mouth 2 (two) times daily as needed (muscle spasms)., Disp: 60 tablet, Rfl: 2    ondansetron (ZOFRAN-ODT) 4 MG TbDL, Take 4 mg by mouth as needed., Disp: , Rfl:     pantoprazole (PROTONIX) 40 MG tablet, Take 1 tablet (40 mg total) by mouth 2 (two) times daily., Disp: 180 tablet, Rfl: 1    polyethylene glycol (GLYCOLAX) 17 gram PwPk, Take 17 g by mouth once daily., Disp: 20 each, Rfl: 0    promethazine-dextromethorphan (PROMETHAZINE-DM) 6.25-15 mg/5 mL Syrp, Take 6.25 mg by mouth as needed., Disp: , Rfl:     psyllium (METAMUCIL) powder, Take 1 packet by mouth 3 (three) times daily., Disp: , Rfl:     sertraline (ZOLOFT) 100 MG tablet,  Take 200 mg by mouth once daily., Disp: , Rfl:     traZODone (DESYREL) 100 MG tablet, Take 400 mg by mouth every evening. , Disp: , Rfl:     ziprasidone (GEODON) 40 MG Cap, 40 mg 2 (two) times daily. , Disp: , Rfl:     traMADoL (ULTRAM) 50 mg tablet, Take 1 tablet (50 mg total) by mouth every 6 (six) hours., Disp: 60 tablet, Rfl: 2    PMHx/PSHx Updates:        Objective:     Vitals:  Blood pressure 108/68, temperature 97.9 °F (36.6 °C), weight 60.7 kg (133 lb 14.4 oz).    Physical Examination:   GEN: no apparent distress, comfortable; AAOx3  SKIN: no rashes,no ulceration, no Raynaud's, no petechiae, no SQ nodules,  HEAD: normal  EYES: no pallor, no icterus,   NECK: no masses, thyroid normal, trachea midline, no LAD/LN's, supple  CV: RRR with no murmur; l S1 and S2 reg. ,no gallop no rubs,   CHEST: Normal respiratory effort; CTAB; normal breath sounds; no wheeze or crackles  MUSC/Skeletal: ROM normal; no crepitus; joints without synovitis,  +Heberden's deformities  No joint swelling or tenderness of PIP, MCP, wrist, elbow, shoulder, or knee joints  EXTREM: no clubbing, cyanosis, no edema,normal  pulses   NEURO: grossly intact; motor WNL; AAOx3;   PSYCH: normal mood, affect and behavior  LYMPH: normal cervical, supraclavicular          Labs:   Lab Results   Component Value Date    WBC 9.39 08/04/2020    HGB 11.2 (L) 08/04/2020    HCT 36.5 (L) 08/04/2020    MCV 85 08/04/2020     08/04/2020    CMP  @LASTLAB(NA,K,CL,CO2,GLU,BUN,Creatinine,Calcium,PROT,Albumin,Bilitot,Alkphos,AST,ALT,CRP,ESR,RF,CCP,SARKIS,SSA,CPK,uric acid) )@  I have reviewed all available lab results and radiology reports.    Radiology/Diagnostic Studies:        Assessment/Plan:   (1) 49 y.o. female with diagnosis of osteoarthritis and fibromyalgia  Discussed results of labs:.Normal inflammatory markers negative SARKIS/ SSA; negative rheumatoid factor and CCP antibody    Can adfd  tramadol 50 mg twice a day as needed p.r.n. pain          Discussion:      I have explained all of the above in detail and the patient understands all of the current recommendation(s). I have answered all questions to the best of my ability and to their complete satisfaction.       The patient is to continue with the current management plan         RTC in   3 months or before if needed      Electronically signed by Yuliya Kenney MD

## 2020-10-23 ENCOUNTER — PATIENT MESSAGE (OUTPATIENT)
Dept: PAIN MEDICINE | Facility: CLINIC | Age: 50
End: 2020-10-23

## 2020-10-23 ENCOUNTER — TELEPHONE (OUTPATIENT)
Dept: PAIN MEDICINE | Facility: CLINIC | Age: 50
End: 2020-10-23

## 2020-10-23 NOTE — TELEPHONE ENCOUNTER
Tried to call to r/s appt on 10/30. No answer. Left   Ronan Tomas, MA  [facility]   [facility]

## 2020-11-03 ENCOUNTER — TELEPHONE (OUTPATIENT)
Dept: PAIN MEDICINE | Facility: CLINIC | Age: 50
End: 2020-11-03

## 2020-11-03 NOTE — TELEPHONE ENCOUNTER
Attempted to call patient regarding cancelled appointment last week. Left v/m for patient to call me back to see if she would like to reschedule appt.

## 2021-01-28 PROBLEM — Z80.0 FAMILY HISTORY OF COLON CANCER: Status: ACTIVE | Noted: 2021-01-28

## 2021-03-10 ENCOUNTER — TELEPHONE (OUTPATIENT)
Dept: PAIN MEDICINE | Facility: CLINIC | Age: 51
End: 2021-03-10

## 2021-05-06 ENCOUNTER — TELEPHONE (OUTPATIENT)
Dept: RHEUMATOLOGY | Facility: CLINIC | Age: 51
End: 2021-05-06

## 2021-05-10 ENCOUNTER — PATIENT MESSAGE (OUTPATIENT)
Dept: RESEARCH | Facility: HOSPITAL | Age: 51
End: 2021-05-10

## 2021-06-16 ENCOUNTER — TELEPHONE (OUTPATIENT)
Dept: RHEUMATOLOGY | Facility: CLINIC | Age: 51
End: 2021-06-16

## 2021-07-16 ENCOUNTER — OFFICE VISIT (OUTPATIENT)
Dept: RHEUMATOLOGY | Facility: CLINIC | Age: 51
End: 2021-07-16
Payer: COMMERCIAL

## 2021-07-16 VITALS
DIASTOLIC BLOOD PRESSURE: 95 MMHG | HEART RATE: 89 BPM | HEIGHT: 63 IN | SYSTOLIC BLOOD PRESSURE: 132 MMHG | BODY MASS INDEX: 26.25 KG/M2 | WEIGHT: 148.13 LBS

## 2021-07-16 DIAGNOSIS — F31.9 BIPOLAR AFFECTIVE DISORDER, REMISSION STATUS UNSPECIFIED: ICD-10-CM

## 2021-07-16 DIAGNOSIS — M79.7 FIBROMYALGIA: ICD-10-CM

## 2021-07-16 DIAGNOSIS — M15.9 OSTEOARTHRITIS OF MULTIPLE JOINTS, UNSPECIFIED OSTEOARTHRITIS TYPE: Primary | ICD-10-CM

## 2021-07-16 DIAGNOSIS — F32.A DEPRESSION, UNSPECIFIED DEPRESSION TYPE: ICD-10-CM

## 2021-07-16 DIAGNOSIS — M19.011 ARTHROPATHY OF RIGHT SHOULDER: ICD-10-CM

## 2021-07-16 PROCEDURE — 99999 PR PBB SHADOW E&M-EST. PATIENT-LVL IV: ICD-10-PCS | Mod: PBBFAC,,, | Performed by: STUDENT IN AN ORGANIZED HEALTH CARE EDUCATION/TRAINING PROGRAM

## 2021-07-16 PROCEDURE — 99999 PR PBB SHADOW E&M-EST. PATIENT-LVL IV: CPT | Mod: PBBFAC,,, | Performed by: STUDENT IN AN ORGANIZED HEALTH CARE EDUCATION/TRAINING PROGRAM

## 2021-07-16 PROCEDURE — 1125F AMNT PAIN NOTED PAIN PRSNT: CPT | Mod: S$GLB,,, | Performed by: STUDENT IN AN ORGANIZED HEALTH CARE EDUCATION/TRAINING PROGRAM

## 2021-07-16 PROCEDURE — 99417 PR PROLONGED SVC, OUTPT, W/WO DIRECT PT CONTACT,  EA ADDTL 15 MIN: ICD-10-PCS | Mod: S$GLB,,, | Performed by: STUDENT IN AN ORGANIZED HEALTH CARE EDUCATION/TRAINING PROGRAM

## 2021-07-16 PROCEDURE — 99205 PR OFFICE/OUTPT VISIT, NEW, LEVL V, 60-74 MIN: ICD-10-PCS | Mod: S$GLB,,, | Performed by: STUDENT IN AN ORGANIZED HEALTH CARE EDUCATION/TRAINING PROGRAM

## 2021-07-16 PROCEDURE — 99205 OFFICE O/P NEW HI 60 MIN: CPT | Mod: S$GLB,,, | Performed by: STUDENT IN AN ORGANIZED HEALTH CARE EDUCATION/TRAINING PROGRAM

## 2021-07-16 PROCEDURE — 99417 PROLNG OP E/M EACH 15 MIN: CPT | Mod: S$GLB,,, | Performed by: STUDENT IN AN ORGANIZED HEALTH CARE EDUCATION/TRAINING PROGRAM

## 2021-07-16 PROCEDURE — 3008F PR BODY MASS INDEX (BMI) DOCUMENTED: ICD-10-PCS | Mod: CPTII,S$GLB,, | Performed by: STUDENT IN AN ORGANIZED HEALTH CARE EDUCATION/TRAINING PROGRAM

## 2021-07-16 PROCEDURE — 3008F BODY MASS INDEX DOCD: CPT | Mod: CPTII,S$GLB,, | Performed by: STUDENT IN AN ORGANIZED HEALTH CARE EDUCATION/TRAINING PROGRAM

## 2021-07-16 PROCEDURE — 1125F PR PAIN SEVERITY QUANTIFIED, PAIN PRESENT: ICD-10-PCS | Mod: S$GLB,,, | Performed by: STUDENT IN AN ORGANIZED HEALTH CARE EDUCATION/TRAINING PROGRAM

## 2021-07-16 RX ORDER — LEVOTHYROXINE, LIOTHYRONINE 38; 9 UG/1; UG/1
75 TABLET ORAL
COMMUNITY
Start: 2021-07-09

## 2021-07-16 RX ORDER — MELOXICAM 15 MG/1
15 TABLET ORAL DAILY
Qty: 90 TABLET | Refills: 0 | Status: SHIPPED | OUTPATIENT
Start: 2021-07-16 | End: 2022-08-16

## 2021-07-16 RX ORDER — ZIPRASIDONE HYDROCHLORIDE 40 MG/1
40 CAPSULE ORAL 2 TIMES DAILY
COMMUNITY
Start: 2021-04-20 | End: 2022-04-04

## 2021-07-16 RX ORDER — LURASIDONE HYDROCHLORIDE 20 MG/1
20 TABLET, FILM COATED ORAL DAILY
COMMUNITY
End: 2022-03-31

## 2021-07-16 RX ORDER — ESZOPICLONE 1 MG/1
TABLET, FILM COATED ORAL
COMMUNITY
Start: 2021-06-25 | End: 2022-04-04

## 2021-07-16 RX ORDER — LITHIUM CARBONATE 300 MG/1
CAPSULE ORAL
COMMUNITY
Start: 2021-06-30

## 2021-07-16 ASSESSMENT — ROUTINE ASSESSMENT OF PATIENT INDEX DATA (RAPID3)
FATIGUE SCORE: 9.5
TOTAL RAPID3 SCORE: 6.44
PAIN SCORE: 7.5
PSYCHOLOGICAL DISTRESS SCORE: 9.9
PATIENT GLOBAL ASSESSMENT SCORE: 9.5
WHEN YOU AWAKENED IN THE MORNING OVER THE LAST WEEK, PLEASE INDICATE THE AMOUNT OF TIME IT TAKES UNTIL YOU ARE AS LIMBER AS YOU WILL BE FOR THE DAY: 1 HOUR
MDHAQ FUNCTION SCORE: 0.7
AM STIFFNESS SCORE: 1, YES

## 2022-01-31 ENCOUNTER — HOSPITAL ENCOUNTER (OUTPATIENT)
Dept: CARDIOLOGY | Facility: HOSPITAL | Age: 52
Discharge: HOME | End: 2022-01-31
Payer: COMMERCIAL

## 2022-01-31 ENCOUNTER — OFFICE VISIT (OUTPATIENT)
Dept: CARDIOLOGY | Facility: CLINIC | Age: 52
End: 2022-01-31
Payer: COMMERCIAL

## 2022-01-31 VITALS
BODY MASS INDEX: 32.65 KG/M2 | WEIGHT: 184.31 LBS | HEART RATE: 82 BPM | SYSTOLIC BLOOD PRESSURE: 122 MMHG | DIASTOLIC BLOOD PRESSURE: 80 MMHG

## 2022-01-31 DIAGNOSIS — E78.1 HYPERTRIGLYCERIDEMIA: ICD-10-CM

## 2022-01-31 DIAGNOSIS — I48.0 PAROXYSMAL ATRIAL FIBRILLATION: ICD-10-CM

## 2022-01-31 DIAGNOSIS — I48.3 TYPICAL ATRIAL FLUTTER: ICD-10-CM

## 2022-01-31 DIAGNOSIS — R00.2 POST-COVID CHRONIC PALPITATIONS: ICD-10-CM

## 2022-01-31 DIAGNOSIS — I10 ESSENTIAL HYPERTENSION: Primary | ICD-10-CM

## 2022-01-31 DIAGNOSIS — U09.9 POST-COVID CHRONIC PALPITATIONS: ICD-10-CM

## 2022-01-31 DIAGNOSIS — R00.2 PALPITATIONS: ICD-10-CM

## 2022-01-31 DIAGNOSIS — I48.3 TYPICAL ATRIAL FLUTTER: Primary | ICD-10-CM

## 2022-01-31 PROCEDURE — 1159F PR MEDICATION LIST DOCUMENTED IN MEDICAL RECORD: ICD-10-PCS | Mod: CPTII,S$GLB,, | Performed by: INTERNAL MEDICINE

## 2022-01-31 PROCEDURE — 99204 PR OFFICE/OUTPT VISIT, NEW, LEVL IV, 45-59 MIN: ICD-10-PCS | Mod: S$GLB,,, | Performed by: INTERNAL MEDICINE

## 2022-01-31 PROCEDURE — 3074F SYST BP LT 130 MM HG: CPT | Mod: CPTII,S$GLB,, | Performed by: INTERNAL MEDICINE

## 2022-01-31 PROCEDURE — 1159F MED LIST DOCD IN RCRD: CPT | Mod: CPTII,S$GLB,, | Performed by: INTERNAL MEDICINE

## 2022-01-31 PROCEDURE — 93005 ELECTROCARDIOGRAM TRACING: CPT | Mod: PO

## 2022-01-31 PROCEDURE — 99204 OFFICE O/P NEW MOD 45 MIN: CPT | Mod: S$GLB,,, | Performed by: INTERNAL MEDICINE

## 2022-01-31 PROCEDURE — 99999 PR PBB SHADOW E&M-EST. PATIENT-LVL IV: CPT | Mod: PBBFAC,,, | Performed by: INTERNAL MEDICINE

## 2022-01-31 PROCEDURE — 1160F PR REVIEW ALL MEDS BY PRESCRIBER/CLIN PHARMACIST DOCUMENTED: ICD-10-PCS | Mod: CPTII,S$GLB,, | Performed by: INTERNAL MEDICINE

## 2022-01-31 PROCEDURE — 99999 PR PBB SHADOW E&M-EST. PATIENT-LVL IV: ICD-10-PCS | Mod: PBBFAC,,, | Performed by: INTERNAL MEDICINE

## 2022-01-31 PROCEDURE — 3079F DIAST BP 80-89 MM HG: CPT | Mod: CPTII,S$GLB,, | Performed by: INTERNAL MEDICINE

## 2022-01-31 PROCEDURE — 93010 ELECTROCARDIOGRAM REPORT: CPT | Mod: ,,, | Performed by: INTERNAL MEDICINE

## 2022-01-31 PROCEDURE — 93010 EKG 12-LEAD: ICD-10-PCS | Mod: ,,, | Performed by: INTERNAL MEDICINE

## 2022-01-31 PROCEDURE — 3008F PR BODY MASS INDEX (BMI) DOCUMENTED: ICD-10-PCS | Mod: CPTII,S$GLB,, | Performed by: INTERNAL MEDICINE

## 2022-01-31 PROCEDURE — 1160F RVW MEDS BY RX/DR IN RCRD: CPT | Mod: CPTII,S$GLB,, | Performed by: INTERNAL MEDICINE

## 2022-01-31 PROCEDURE — 3008F BODY MASS INDEX DOCD: CPT | Mod: CPTII,S$GLB,, | Performed by: INTERNAL MEDICINE

## 2022-01-31 PROCEDURE — 3074F PR MOST RECENT SYSTOLIC BLOOD PRESSURE < 130 MM HG: ICD-10-PCS | Mod: CPTII,S$GLB,, | Performed by: INTERNAL MEDICINE

## 2022-01-31 PROCEDURE — 3079F PR MOST RECENT DIASTOLIC BLOOD PRESSURE 80-89 MM HG: ICD-10-PCS | Mod: CPTII,S$GLB,, | Performed by: INTERNAL MEDICINE

## 2022-01-31 NOTE — PROGRESS NOTES
Subjective:    Patient ID:  Helen Pickard is a 51 y.o. female who presents for evaluation of elevated heart rate      HPI51 yo WF post covid complaining of rapid heart rate and palpitations. . Resting EKG normal.    Review of Systems   Cardiovascular: Positive for irregular heartbeat and palpitations. Negative for chest pain, claudication, cyanosis, dyspnea on exertion, leg swelling, near-syncope, orthopnea, paroxysmal nocturnal dyspnea and syncope.        Objective:    Physical Exam  Vitals and nursing note reviewed.   Constitutional:       Appearance: She is well-developed and well-nourished.      Comments: /80 (BP Location: Right arm, Patient Position: Sitting, BP Method: Large (Manual))   Pulse 82   Wt 83.6 kg (184 lb 4.8 oz)   LMP  (LMP Unknown)   BMI 32.65 kg/m²      HENT:      Head: Normocephalic and atraumatic.      Right Ear: External ear normal.      Left Ear: External ear normal.      Nose: Nose normal.      Mouth/Throat:      Mouth: Oropharynx is clear and moist.   Eyes:      General: Lids are normal. No scleral icterus.        Right eye: No discharge.         Left eye: No discharge.      Extraocular Movements: EOM normal.      Conjunctiva/sclera: Conjunctivae normal.      Right eye: No hemorrhage.     Pupils: Pupils are equal, round, and reactive to light.   Neck:      Thyroid: No thyromegaly.      Vascular: No JVD.      Trachea: No tracheal deviation.   Cardiovascular:      Rate and Rhythm: Normal rate and regular rhythm.      Pulses: Intact distal pulses.      Heart sounds: Normal heart sounds. No murmur heard.  No friction rub. No gallop.    Pulmonary:      Effort: Pulmonary effort is normal. No respiratory distress.      Breath sounds: Normal breath sounds. No wheezing or rales.   Chest:      Chest wall: No tenderness.   Breasts: Breasts are symmetrical.       Abdominal:      General: Bowel sounds are normal. There is no distension.      Palpations: Abdomen is soft. There is no  hepatomegaly, hepatosplenomegaly or mass.      Tenderness: There is no abdominal tenderness. There is no guarding or rebound.   Musculoskeletal:         General: No tenderness or edema. Normal range of motion.      Cervical back: Normal range of motion and neck supple.   Lymphadenopathy:      Cervical: No cervical adenopathy.   Skin:     General: Skin is warm and dry.      Coloration: Skin is not pale.      Findings: No erythema or rash.   Neurological:      Mental Status: She is alert and oriented to person, place, and time.      Cranial Nerves: No cranial nerve deficit.      Coordination: Coordination normal.      Deep Tendon Reflexes: Reflexes normal.   Psychiatric:         Mood and Affect: Mood and affect normal.         Behavior: Behavior normal.         Thought Content: Thought content normal.         Judgment: Judgment normal.           Assessment:       1. Hypertension    2. Mild Hypertriglyceridemia    3. Palpitations    4. Post-COVID chronic palpitations         Plan:     Patient advised to limit exposure to caffeine, stimulants, and alcohol      Orders Placed This Encounter   Procedures    Holter monitor - 48 hour    Echo     Follow up if symptoms worsen or fail to improve, for Will call with results.

## 2022-02-09 ENCOUNTER — HOSPITAL ENCOUNTER (OUTPATIENT)
Dept: CARDIOLOGY | Facility: HOSPITAL | Age: 52
Discharge: HOME | End: 2022-02-09
Attending: INTERNAL MEDICINE
Payer: COMMERCIAL

## 2022-02-09 DIAGNOSIS — R00.2 PALPITATIONS: ICD-10-CM

## 2022-02-09 DIAGNOSIS — I10 ESSENTIAL HYPERTENSION: ICD-10-CM

## 2022-02-09 DIAGNOSIS — E78.1 HYPERTRIGLYCERIDEMIA: ICD-10-CM

## 2022-02-09 PROCEDURE — 93227 XTRNL ECG REC<48 HR R&I: CPT | Mod: ,,, | Performed by: INTERNAL MEDICINE

## 2022-02-09 PROCEDURE — 93227 HOLTER MONITOR - 48 HOUR (CUPID ONLY): ICD-10-PCS | Mod: ,,, | Performed by: INTERNAL MEDICINE

## 2022-02-09 PROCEDURE — 93226 XTRNL ECG REC<48 HR SCAN A/R: CPT | Mod: PO

## 2022-02-14 ENCOUNTER — TELEPHONE (OUTPATIENT)
Dept: CARDIOLOGY | Facility: CLINIC | Age: 52
End: 2022-02-14
Payer: COMMERCIAL

## 2022-02-14 LAB
OHS CV EVENT MONITOR DAY: 2
OHS CV HOLTER LENGTH DECIMAL HOURS: 96
OHS CV HOLTER LENGTH HOURS: 48
OHS CV HOLTER LENGTH MINUTES: 0
OHS CV HOLTER SINUS AVERAGE HR: 78
OHS CV HOLTER SINUS MAX HR: 146
OHS CV HOLTER SINUS MIN HR: 48

## 2022-02-15 ENCOUNTER — PATIENT MESSAGE (OUTPATIENT)
Dept: CARDIOLOGY | Facility: HOSPITAL | Age: 52
End: 2022-02-15
Payer: COMMERCIAL

## 2022-04-05 ENCOUNTER — TELEPHONE (OUTPATIENT)
Dept: NEUROLOGY | Facility: CLINIC | Age: 52
End: 2022-04-05
Payer: COMMERCIAL

## 2022-04-05 NOTE — TELEPHONE ENCOUNTER
----- Message from [facility] sent at 4/5/2022  3:59 PM CDT -----  .Type: Appointment Request     Caller is requesting appointment referral in the system     Was A Appointment Solution Found When Scheduling? None     Best Call Back Number:  [phone]    Additional Information: none

## 2022-04-05 NOTE — TELEPHONE ENCOUNTER
Spoke with the pt, reports she needs to schedule an appt from her referral. The referral has multiple diagnoses, the pt explained they are trying ot r/o MS. The pt is scheduled for an MRI of the Brain Non Contrast Thursday, pt scheduled with Gabriella Cronin NP to review and determine if referral is needed for MS clinic,.

## 2022-04-14 ENCOUNTER — LAB VISIT (OUTPATIENT)
Dept: LAB | Facility: HOSPITAL | Age: 52
End: 2022-04-14
Attending: NURSE PRACTITIONER
Payer: COMMERCIAL

## 2022-04-14 ENCOUNTER — OFFICE VISIT (OUTPATIENT)
Dept: NEUROLOGY | Facility: CLINIC | Age: 52
End: 2022-04-14
Payer: COMMERCIAL

## 2022-04-14 VITALS
SYSTOLIC BLOOD PRESSURE: 131 MMHG | BODY MASS INDEX: 33.6 KG/M2 | HEART RATE: 80 BPM | RESPIRATION RATE: 16 BRPM | DIASTOLIC BLOOD PRESSURE: 85 MMHG | HEIGHT: 63 IN | WEIGHT: 189.63 LBS

## 2022-04-14 DIAGNOSIS — R25.3 MUSCLE TWITCHING: ICD-10-CM

## 2022-04-14 DIAGNOSIS — G45.9 TRANSIENT CEREBRAL ISCHEMIA, UNSPECIFIED TYPE: ICD-10-CM

## 2022-04-14 DIAGNOSIS — U09.9 POST-COVID CHRONIC PALPITATIONS: ICD-10-CM

## 2022-04-14 DIAGNOSIS — R41.3 OTHER AMNESIA: ICD-10-CM

## 2022-04-14 DIAGNOSIS — H53.8 BLURRY VISION, BILATERAL: ICD-10-CM

## 2022-04-14 DIAGNOSIS — F31.4 BIPOLAR DISORDER, CURRENT EPISODE DEPRESSED, SEVERE, WITHOUT PSYCHOTIC FEATURES: ICD-10-CM

## 2022-04-14 DIAGNOSIS — M19.011 ARTHROPATHY OF RIGHT SHOULDER: ICD-10-CM

## 2022-04-14 DIAGNOSIS — H93.239 PAINFUL SENSITIVITY TO SOUND: ICD-10-CM

## 2022-04-14 DIAGNOSIS — R00.2 POST-COVID CHRONIC PALPITATIONS: ICD-10-CM

## 2022-04-14 DIAGNOSIS — Z98.84 HISTORY OF BARIATRIC SURGERY: ICD-10-CM

## 2022-04-14 DIAGNOSIS — R68.89 SENSITIVITY TO LIGHT: ICD-10-CM

## 2022-04-14 DIAGNOSIS — F41.1 ANXIETY, GENERALIZED: ICD-10-CM

## 2022-04-14 DIAGNOSIS — R68.89 MULTIPLE SOMATIC COMPLAINTS: ICD-10-CM

## 2022-04-14 DIAGNOSIS — R42 DIZZINESS AND GIDDINESS: ICD-10-CM

## 2022-04-14 DIAGNOSIS — M62.81 MUSCLE WEAKNESS: ICD-10-CM

## 2022-04-14 DIAGNOSIS — R29.6 FALLS FREQUENTLY: ICD-10-CM

## 2022-04-14 DIAGNOSIS — G43.001 MIGRAINE WITHOUT AURA AND WITH STATUS MIGRAINOSUS, NOT INTRACTABLE: Primary | ICD-10-CM

## 2022-04-14 DIAGNOSIS — M79.7 FIBROMYALGIA: ICD-10-CM

## 2022-04-14 DIAGNOSIS — R53.83 FATIGUE, UNSPECIFIED TYPE: ICD-10-CM

## 2022-04-14 LAB
CREAT SERPL-MCNC: 1 MG/DL (ref 0.5–1.4)
EST. GFR  (AFRICAN AMERICAN): >60 ML/MIN/1.73 M^2
EST. GFR  (NON AFRICAN AMERICAN): >60 ML/MIN/1.73 M^2

## 2022-04-14 PROCEDURE — 1159F MED LIST DOCD IN RCRD: CPT | Mod: CPTII,S$GLB,, | Performed by: NURSE PRACTITIONER

## 2022-04-14 PROCEDURE — 3075F PR MOST RECENT SYSTOLIC BLOOD PRESS GE 130-139MM HG: ICD-10-PCS | Mod: CPTII,S$GLB,, | Performed by: NURSE PRACTITIONER

## 2022-04-14 PROCEDURE — 99417 PROLNG OP E/M EACH 15 MIN: CPT | Mod: S$GLB,,, | Performed by: NURSE PRACTITIONER

## 2022-04-14 PROCEDURE — 82565 ASSAY OF CREATININE: CPT | Performed by: NURSE PRACTITIONER

## 2022-04-14 PROCEDURE — 3008F PR BODY MASS INDEX (BMI) DOCUMENTED: ICD-10-PCS | Mod: CPTII,S$GLB,, | Performed by: NURSE PRACTITIONER

## 2022-04-14 PROCEDURE — 3079F PR MOST RECENT DIASTOLIC BLOOD PRESSURE 80-89 MM HG: ICD-10-PCS | Mod: CPTII,S$GLB,, | Performed by: NURSE PRACTITIONER

## 2022-04-14 PROCEDURE — 1159F PR MEDICATION LIST DOCUMENTED IN MEDICAL RECORD: ICD-10-PCS | Mod: CPTII,S$GLB,, | Performed by: NURSE PRACTITIONER

## 2022-04-14 PROCEDURE — 3079F DIAST BP 80-89 MM HG: CPT | Mod: CPTII,S$GLB,, | Performed by: NURSE PRACTITIONER

## 2022-04-14 PROCEDURE — 3075F SYST BP GE 130 - 139MM HG: CPT | Mod: CPTII,S$GLB,, | Performed by: NURSE PRACTITIONER

## 2022-04-14 PROCEDURE — 99999 PR PBB SHADOW E&M-EST. PATIENT-LVL V: CPT | Mod: PBBFAC,,, | Performed by: NURSE PRACTITIONER

## 2022-04-14 PROCEDURE — 36415 COLL VENOUS BLD VENIPUNCTURE: CPT | Mod: PO | Performed by: NURSE PRACTITIONER

## 2022-04-14 PROCEDURE — 99999 PR PBB SHADOW E&M-EST. PATIENT-LVL V: ICD-10-PCS | Mod: PBBFAC,,, | Performed by: NURSE PRACTITIONER

## 2022-04-14 PROCEDURE — 99205 OFFICE O/P NEW HI 60 MIN: CPT | Mod: S$GLB,,, | Performed by: NURSE PRACTITIONER

## 2022-04-14 PROCEDURE — 4010F ACE/ARB THERAPY RXD/TAKEN: CPT | Mod: CPTII,S$GLB,, | Performed by: NURSE PRACTITIONER

## 2022-04-14 PROCEDURE — 4010F PR ACE/ARB THEARPY RXD/TAKEN: ICD-10-PCS | Mod: CPTII,S$GLB,, | Performed by: NURSE PRACTITIONER

## 2022-04-14 PROCEDURE — 3008F BODY MASS INDEX DOCD: CPT | Mod: CPTII,S$GLB,, | Performed by: NURSE PRACTITIONER

## 2022-04-14 PROCEDURE — 99417 PR PROLONGED SVC, OUTPT, W/WO DIRECT PT CONTACT,  EA ADDTL 15 MIN: ICD-10-PCS | Mod: S$GLB,,, | Performed by: NURSE PRACTITIONER

## 2022-04-14 PROCEDURE — 99205 PR OFFICE/OUTPT VISIT, NEW, LEVL V, 60-74 MIN: ICD-10-PCS | Mod: S$GLB,,, | Performed by: NURSE PRACTITIONER

## 2022-04-14 NOTE — ASSESSMENT & PLAN NOTE
Pt reports spells lasting for 1-2 weeks at a time that involve multiple symptoms (see HPI).   MRI brain non con personally reviewed with pt and  -- no acute or chronic abnormality. No FLAIR changes to suggest prior demyelination or ischemic insult.   Facial droop and dysarthria lasting for a weeks time without imaging correlate is most likely somatization/conversion and not a vascular event. Epileptic events are a consideration, but would also not last for that duration. We will obtain a routine EEG for completeness at this point. We will also obtain contrasted brain images, mainly to reassure her given the situation.     Exam is grossly normal today outside of tremor, which is likely enhanced physiologic + drug induced (Lithium).

## 2022-04-14 NOTE — PROGRESS NOTES
"NEUROLOGY  Outpatient Consultation Visit     [facility]  [address] [facility], [place] [address]  [phone] (office) / [phone] (fax)    Patient Name:  Helen Pickard  :  1970  MR #:  [id number]  Acct #:  [id number]    Date of  Visit: 2022    Other Physicians:  Tae Mccullough MD (Primary Care Physician)      CHIEF COMPLAINT: Numbness      HISTORY OF PRESENT ILLNESS:  Helen Pickard is a 51 y.o. R-handed female seen in consultation for multiple complaints per Self, Aaareferral    Medical history is significant for HTN, HLD, osteopenia, fibromyalgia, thyroid disease, bipolar disorder, ADHD, anxiety, migraines    Here today with her     She has multiple complaints to address today. States that she saw NP in  recently and was told that she could have MS, so was referred to see neurology for evaluation.     She describes spells that last for 1-2 weeks at a time. They have been occurring every few months over the last couple of years. She experiences several symptoms during a spell, including brain fog, difficulty concentrating, diffuse shaking in her extremities, numbness in the fingertips, lightheadedness and weakness in the BLEs. She will sweat a lot. She will also have light sensitivity, sound sensitivity, smell sensitivity and diarrhea with them. With each episode, she has noted a "low grade fever" (states her typical temp is 97 and gets up to 98.4). It seems like the left leg is weaker than the R and she will get a throbbing pain in the lower part of the left leg. The left side of her face will droop and her speech will be slurred also during these spells. This will be constant throughout the duration and not fluctuate. Her left eye will twitch and her whole body will itch. During this time frame,  finds that she will having staring episodes and may not respond to him for a few minutes.     She reports a history of migraines. She takes Migrelief " "daily for this. The pain is a throbbing pain in both temples. She will get light and sound sensitivity with them and have to lay down. She doesn't think that the spells that she has are migrainous. She doesn't get a migraine during a spell usually. She has migraines 2x per month on average.     She has chronic pain. Her shoulders feel like "her meat is coming off her bones." She has seen ortho for this and was told that it was just wear and tear due to her age. She has seen NSGY for concerns of arm weakness and neck pain. She had a neck MRI and then was referred to see Dr. Garcia, whom she thought was a neurologist. He did a nerve test on her and said that she had tennis elbow. She was diagnosed with fibromyalgia in her 20s. She was seeing rheumatology for this, but her provider left.     She sees psychiatry (Dr. Marshall with [facility]) for management of bipolar disorder and anxiety. She takes Lithium and Cymbalta. Her PCP recently checked a serotonin level on her to evaluate for serotonin syndrome, but her level was very low. She has lithium levels checked regularly and they have not been high.     She is tearful throughout the visit today. She expresses frustration about always feeling poorly and not knowing what is wrong with her. She states that she wants to "come to [facility] and blow her brains out so that everyone will have her blood on their hands." Then, she retracts, saying that she doesn't actually mean that but that she is tired of "doctors dismissing her and treating her like she is crazy." She adamantly denies any actual plans to harm herself or anyone else. She has been admitted for inpatient psych treatment several times since 2000, most recently was in [facility] for 10 days in February. These admissions have been voluntary per .       Allergies:  Review of patient's allergies indicates:   Allergen Reactions    Gabapentin Other (See Comments)     Confusion, brain fog and thomas "    Iodinated contrast media Shortness Of Breath     Itchy, nausea    Abilify [aripiprazole]     Blue dye     Elavil [amitriptyline]     Mirabegron     Morphine      Hallucinations      Red dye     Rexulti [brexpiprazole]     Tramadol Other (See Comments)     Feels drunk    Wellbutrin [bupropion hcl] Other (See Comments)     Depressed and couldn't get out of bed    Yellow dye        Current Medications:  Current Outpatient Medications   Medication Sig Dispense Refill    cetirizine (ZYRTEC) 10 MG tablet Take 10 mg by mouth once daily.      cholecalciferol, vitamin D3, (VITAMIN D3) 125 mcg (5,000 unit) Tab Take 1 tablet (5,000 Units total) by mouth once daily. 90 tablet 3    DULoxetine (CYMBALTA) 30 MG capsule Take 1 capsule (30 mg total) by mouth once daily. (Patient taking differently: Take 60 mg by mouth once daily.) 30 capsule 11    lithium (ESKALITH) 300 MG capsule       losartan (COZAAR) 100 MG tablet Take 1 tablet (100 mg total) by mouth once daily. 90 tablet 3    meloxicam (MOBIC) 15 MG tablet Take 1 tablet (15 mg total) by mouth once daily. 90 tablet 0    methocarbamoL (ROBAXIN) 500 MG Tab Take 1 tablet (500 mg total) by mouth 2 (two) times daily as needed. 180 tablet 1    NP THYROID 60 mg Tab       ondansetron (ZOFRAN-ODT) 4 MG TbDL Take 1 tablet (4 mg total) by mouth every 8 (eight) hours as needed. (Patient taking differently: Take 4 mg by mouth every 8 (eight) hours as needed. PRN) 90 tablet 1    traZODone (DESYREL) 300 MG tablet       UNABLE TO FIND Migra Eeze      acetaminophen (TYLENOL) 325 MG tablet Take 2 tablets (650 mg total) by mouth every 8 (eight) hours as needed for Temperature greater than (or equal to 101 degree F). (Patient not taking: Reported on 4/14/2022)  0    bisacodyL (DULCOLAX) 5 mg EC tablet Dulcolax (bisacodyl) 5 mg tablet,delayed release   Take 2 tablets by oral route as directed.      diazePAM (VALIUM) 10 MG Tab Take 1 tablet (10 mg total) by mouth once.  for 1 dose 1 tablet 0    diclofenac sodium (VOLTAREN) 1 % Gel Voltaren Arthritis Pain 1 % topical gel   APPLY 2 GRAMS TO THE AFFECTED AREA(S) BY TOPICAL ROUTE 4 TIMES PER DAY      docusate sodium (COLACE) 100 MG capsule Take 1 capsule (100 mg total) by mouth 2 (two) times daily as needed for Constipation. (Patient not taking: Reported on 4/14/2022)  0    ferrous gluconate (FERGON) 324 MG tablet Take 1 tablet by mouth once daily.      hydrOXYzine pamoate (VISTARIL) 25 MG Cap Take 1 Capsule BY MOUTH EVERY 4 HOURS AS NEEDED FOR ANXIETY      Lactobacillus acidophilus (PROBIOTIC) 10 billion cell Cap       LAXATIVE, BISACODYL, 5 mg EC tablet TAKE 2 TABLETS BY MOUTH AS DIRECTED on prep sheet      pantoprazole (PROTONIX) 40 MG tablet Take 1 tablet (40 mg total) by mouth 2 (two) times daily. (Patient not taking: Reported on 4/14/2022) 180 tablet 3    propranoloL (INDERAL) 10 MG tablet Take 10 mg by mouth 2 (two) times daily.      psyllium (METAMUCIL) powder Take 1 packet by mouth 3 (three) times daily.      UNABLE TO FIND Bi-est progesterone/Testerone (80/20% e 3 e 2) 2/75/6 mg capsules one daily      zinc gluconate 50 mg tablet Take 50 mg by mouth once daily.       No current facility-administered medications for this visit.       Past Medical History:  Past Medical History:   Diagnosis Date    b Hypertension     8/3/15 RXd A Low Salt Diet     c Hypercholesterolemia With High HDL     9/25/18 RXd Lifestyle Changes    c Mild Hypertriglyceridemia     d Family H/O DM     f Obesity     This; 9/24/18 TFTs, JULIANA, And Cortisol = Unremarkable    f Osteopenia     10/19/18 RXd Fosamax 35 Mg Weekly, Continued Her OTC D3 5K IU daily, And RXd OTC CaCO3 1,200 Mg Daily; She Is Also On HRT    f Weight Gain ###    9/20/18 Likely Due To Rixulti And I Recommended She F/U With Her Psychiatrist For This; 9/24/18 TFTs, JULIANA, And Cortisol = Unremarkable    g Chronic Mild Iron Deficiency Anemia     1/31/20 RXd Ferrous Gluconate 324  "Mg Daily; Likely Do To Post Bariatric Surgery Decreased Absorption    j Family H/O Colon Polyps     Dr. Justino Mckeon 12/14/15: "Repeat TC In 5 YRs"    j GERD     j H/O Bariatric Sleeve Gastrectomy In 2011 ###    5/2/15 Fe+ Studies And B12 = Normal    j H/O Laparoscopic Cholecystectomy On 10/7/19     Dr. Rc canela H/O Right Hemicolectomy 2014 For (A Benign) Appendix     Dr. Ilda canela Irritable Bowel Syndrome     Dr. Justino Mckeon    k H/O Left Breast Lumpectomy For Benign Mass In 2014     Dr. Ilda mcclain Postmenopausal On Chronic Hormone Replacement Therapy     l Bilateral Hand Tendonitis     5/2/15 RF, SARKIS, UA, CPK And ESR = Normal    l Fibromyalgia ###    6/3/19 (Phone Log) RXd Amitriptyline 50 Mg BID; 6/3/19 RXd Savella 25 Mg BID But This Was Too Expensive; 12/4/15 Referred To Dr. Wu Baird; Lyrica And Cymbalta And Neurontin Innefective; 5/2/15 RF, SARKIS, UA, CPK And ESR = Normal    l Left 3rd Finger Laceration 8/2/15     l Lumbar DDD With Chronic LBP     Dr. Tristan Banda Started Her On Lyrica    l Right Arthropathy Shoulder     5/8/20 Referred To Dr. Tavares otoole Facial And RUE Muscle Twitching     12/4/15 Referred To Dr. Wu Baird    n Anxiety And Depression ###    11/4/19 (Phone Log) RXd Xanax 0.25 Mg Daily PRN., [facility] Saw Her On 12/12/16 And 6/22/16 And 5/17/16     n Attention Deficit Hyperactivity Disorder ###    12/10/20 RXd Adderal-XR 15 Mg qAM; 11/2015 She Tolerated Concerta But Stopped It; Aderrall And Vyvanse Caused Moodiness    n Bipolar Affective Disorder ###    Dr. Daisy Cook (Her [facility] Psychologist) Saw Her On 12/12/16 And 6/22/16 And 5/17/16     n Therapeutic Drug Monitoring     o Migraines     q Vitamin D Deficiency     9/25/18 RXd OTC D3 5K IU Daily    Wellness Visit 5/8/2020        Past Surgical History:  Past Surgical History:   Procedure Laterality " "Date    APPENDECTOMY      BREAST BIOPSY      CHOLECYSTECTOMY  10/07/2019    COLON SURGERY      COLONOSCOPY N/A 1/28/2021    Procedure: COLONOSCOPY;  Surgeon: Justino Mckeon Jr., MD;  Location: [facility] ENDO;  Service: Endoscopy;  Laterality: N/A;    HYSTERECTOMY      LAPAROSCOPIC CHOLECYSTECTOMY N/A 10/7/2019    Procedure: CHOLECYSTECTOMY, LAPAROSCOPIC;  Surgeon: Rc Rico MD;  Location: [facility] OR;  Service: General;  Laterality: N/A;    STOMACH SURGERY      gastric sleeve       Family History:  family history includes Anxiety disorder in her mother; Asthma in her mother; Cancer in her maternal grandfather; Depression in her father and mother; Heart disease in her father; Hyperlipidemia in her mother; Hypertension in her father and mother; Thyroid disease in her mother.    Social History:   reports that she has never smoked. She has never used smokeless tobacco. She reports current alcohol use. She reports that she does not use drugs.      REVIEW OF SYSTEMS:  As per HPI    PHYSICAL EXAM:  /85 (BP Location: Left arm, Patient Position: Sitting, BP Method: Large (Automatic))   Pulse 80   Resp 16   Ht 5' 3" (1.6 m)   Wt 86 kg (189 lb 9.5 oz)   LMP  (LMP Unknown)   BMI 33.59 kg/m²     General: NAD  Pulmonary: Normal effort and rate.   Musculoskeletal: No obvious joint deformities, moves all extremities well.  Extremities: No clubbing, cyanosis or edema.  Psych: Tearful, anxious     Neurological exam:  Mental status: Awake and alert.  Oriented to person, place, time and situation. Recent and remote memory appear to be intact.  Fund of knowledge normal. Normal attention and concentration.   Speech/Language: Fluent and appropriate. No dysarthria or aphasia on conversation. Able to follow complex commands.   Cranial nerves (II-XII): Visual fields full. Pupils equal round and reactive to light, extraocular movements intact, no ptosis, no nystagmus. Facial sensation and symmetry intact bilaterally. Hearing " "grossly intact. Palate mobile and midline. Shoulder shrug normal bilaterally. Normal tongue protrusion.   Motor: 5 out of 5 strength throughout the upper and lower extremities bilaterally. +Give way weakness in eliane deltoids, not reproducible. Normal bulk and tone. No abnormal movements noted. No drift appreciated.   Sensation: Intact to light touch and vibration.   DTR: 2+ at the knees and biceps bilaterally. No clonus, plantar toe response bilaterally.   Coordination: Finger-nose-finger testing intact bilaterally. ADEOLA normal bilaterally. No rest tremor. Diffuse tremulousness, worse with posture and intent (R>L).   Gait: Normal gait, including heel, toe and tandem.     MMSE 4/14/2022   What is the (year), (season), (date), (day), (month)? 4   Where are we (state), (country), (town or city), (hospital), (floor)? 5   Name 3 common objects (eg. "apple", "table", "dianne"). Take 1 second to say each. Then ask the patient to repeat all 3. Give 1 point for each correct answer. Then repeat them until he/she learns all 3. Count trials and record. 3   Serial 7's backwards. Stop after 5 answers. (100,93,86,79,72) or alternatively  spell "WORLD" backwards. (D..L..R..O..W). The score is the number of letters in correct order. 5   Ask for the 3 common objects named earlier in the exam. Give 1 point for each correct answer. 2   Name a "pencil" and "watch." 2   Repeat the following: "No ifs, ands, or buts." 1   Follow a 3-stage command: "Take a paper in your right hand, fold it in half, & put it on the floor." 3   Read and obey the following: (see paper exam) 1   Write a sentence. 1   Copy the following design: (see paper exam) 1   Total MMSE Score 28   Some recent data might be hidden     DIAGNOSTIC DATA:  I have personally reviewed provider notes, labs and imaging made available to me today.     Recent visit with PC KAL Juan reviewed. Referred to neurology for upper extremity weakness and asymmetric LE reflexes.   Various notes " dating back to 2020 from rheumatology, orthopedics, PMR, PCP, NSGY and pain management also reviewed and discuss chronic pain, uncontrolled depression, shoulder pain and fibromyalgia.     Imaging:  MRI brain 4/7/22:  Impression:  No acute intracranial abnormality.  Personally reviewed -- no sig acute or chronic abnormality, age appropriate     MRI C spine 7/2020:  FINDINGS:  The cervical vertebral body heights are preserved.  The static anterior-posterior cervical vertebral body alignment is within normal limits. There is straightening of the normal cervical lordosis which could be related to muscular spasm and/or positioning.  No suspicious bone marrow edema suggestive of acute fracture is visualized.  The cervical cord demonstrates no definite abnormal T2 signal suggestive of definite myelomalacia or cord edema.     C2-C3 demonstrates no significant posterior disc protrusion, central spinal canal stenosis, or neural foraminal stenosis.     C3-C4 demonstrates no significant posterior disc protrusion, central spinal canal stenosis, or neural foraminal stenosis.     C4-C5 demonstrates minimal broad-based posterior disc osteophyte complex slightly asymmetric to the right without significant central spinal canal or neural foraminal stenosis.     C5-C6 demonstrates mild broad-based posterior disc osteophyte complex slightly asymmetric to the left without significant overall central spinal canal or right neural foraminal stenosis.  Minimal left neural foraminal narrowing is noted.     C6-C7 demonstrates minimal broad-based posterior disc osteophyte complex and mild left greater than right facet arthrosis.  No significant central spinal canal or right neural foraminal stenosis is seen.  There is suspected minimal left neural foraminal narrowing.     C7-T1 demonstrates no significant posterior disc protrusion, central spinal canal stenosis, or neural foraminal stenosis.     Impression:  1. Mild multilevel cervical  spondylosis is seen without significant overall central spinal canal stenosis.  There is suspected minimal left neural foraminal narrowing at C5-C6 and C6-C7.  2. There is straightening of the normal cervical lordosis which could be related to muscular spasm and/or positioning.      MRI R shoulder 6/2020:  Impression:  1. Supraspinatus and infraspinatus tendinopathy without evidence of high-grade tear.  2. Subscapularis tendinopathy with partial-thickness tearing demonstrated of distal tendon fibers.  3. Suspected superior labral tear, slap type.  4. Mild AC and glenohumeral joint osteoarthritis.       EMG BUE 7/2020 -  Jose   1. Abnormalities on today's electrodiagnostic testing were isolated to the left ulnar motor nerve conduction studies, in the form of conduction velocity slowing across the left elbow.  However, distal motor and sensory latencies and amplitudes were well within normal limits.  This finding could be consistent with cubital tunnel syndrome, however findings were insufficient to meet the criteria for overt left ulnar neuropathy.  2. There was insufficient electrodiagnostic evidence for diagnoses of peripheral polyneuropathy, myopathy, or active axonal cervical radiculopathy/brachial plexopathy of the right upper extremity.    Cardiac:  Holter 2/2022:  · Predominant Rhythm Sinus rhythm with heart rates varying between 48 and 146 BPM with an average of 78 BPM.  · Ventricular Arrhythmias There were very rare PVCs totalling 81 and averaging 0.84 per hour.  · Supraventricular Arrhythmias There were very rare PACs totalling 7 and averaging 0.07 per hour.  · No symptoms reported    Labs:  CBC:   Lab Results   Component Value Date    WBC 12.27 03/31/2022    HGB 11.3 (L) 03/31/2022    HCT 36.4 (L) 03/31/2022     03/31/2022    MCV 84 03/31/2022    RDW 14.6 (H) 03/31/2022     BMP:   Lab Results   Component Value Date     03/31/2022    K 4.8 03/31/2022     03/31/2022    CO2 29  03/31/2022    BUN 19 (H) 03/31/2022    CREATININE 0.88 03/31/2022     (H) 03/31/2022    CALCIUM 9.3 03/31/2022    MG 2.1 03/31/2022     LFTS;   Lab Results   Component Value Date    PROT 7.4 03/31/2022    ALBUMIN 4.3 03/31/2022    BILITOT 0.3 03/31/2022    AST 28 03/31/2022    ALKPHOS 55 03/31/2022    ALT 19 03/31/2022     COAGS:   Lab Results   Component Value Date    INR 0.9 08/15/2019     FLP:   Lab Results   Component Value Date    CHOL 223 (H) 04/27/2021     (H) 04/27/2021    LDLCALC 98.4 04/27/2021    TRIG 103 04/27/2021    CHOLHDL 46.6 04/27/2021     Component      Latest Ref Rng & Units 4/4/2022 3/31/2022 4/27/2021   Hemoglobin A1C External      0.0 - 5.6 %   5.4   Estimated Avg Glucose      68 - 131 mg/dL   108   SARKIS IgG by IFA      <1:80   Detected (H)   SARKIS Interpretive Comment         See Note   SARKIS Titer         1:80 (A)   SARKIS Pattern         Speckled (A)   SSA Antibody      0 - 40 AU/mL   1   SSA 60 (Ro) SUMMER Antibody      0 - 40 AU/mL   0   Cortisol      ug/dL   7.73   Vitamin B-12      210 - 950 pg/mL   617   Thiamine      70 - 180 nmol/L   99   Vitamin B6      20.0 - 125.0 nmol/L   67.9   Vitamin B2      5 - 50 nmol/L   4 (L)   Sed Rate      0 - 29 mm/Hr   16   Rheumatoid Factor      0.0 - 15.0 IU/mL   <8.6   SSB Antibody      0 - 40 AU/mL   0   CPK      55 - 170 U/L  107    TSH      0.400 - 4.000 uIU/mL  2.420    Free T4      0.78 - 2.19 ng/dL  0.53 (L)    Ferritin      12 - 264 ng/mL  6 (L)    Serotonin      50 - 220 ng/mL 15 (L)     Lithium Level      0.6 - 1.2 mmol/L 0.5 (L)           ASSESSMENT & PLAN:  Helen Pickard is a 51 y.o. R-handed female seen in consultation for multiple complaints, many of which are not neurological.     Problem List Items Addressed This Visit        Neuro    Migraines - Primary    Current Assessment & Plan     Possible that spells described are migraine related but duration is uncharacteristic. Patient does not feel that they are migraine related,  "however.     States that migraines occur 2x per month. Using Migrelief daily.            Facial And RUE Muscle Twitching    Current Assessment & Plan     No abnormal movements on exam today               Psychiatric    Bipolar affective disorder    Current Assessment & Plan     Pt established with psychiatry -- pt and  state that they will call psychiatrist today to set up follow up given circumstances    Multiple admissions for inpatient treatment, most recently in February  Multiple statements made today that are concerning, but patient then retracts them and is adamant that she does not have any intent to harm herself or someone else. Discussed assisting to coordinate readmission via ED / [facility], but patient and  refuse.  , Edilberto, is present during the visit today. He does not feel that she has any true intent to harm herself, states that he will be home with her for the next 4 days and will monitor closely ("I know when she needs to go in for treatment")           Anxiety And Depression       Cardiac/Vascular    Post-COVID chronic palpitations    Current Assessment & Plan     Question if some symptoms could be long COVID, but many present since before carmen COVID              Endocrine    H/O Bariatric Sleeve Gastrectomy In 2011    Current Assessment & Plan     Vitamin levels have been normal              Orthopedic    Fibromyalgia    Right Arthropathy Shoulder       Other    Multiple somatic complaints    Current Assessment & Plan     Pt reports spells lasting for 1-2 weeks at a time that involve multiple symptoms (see HPI).   MRI brain non con personally reviewed with pt and  -- no acute or chronic abnormality. No FLAIR changes to suggest prior demyelination or ischemic insult.   Facial droop and dysarthria lasting for a weeks time without imaging correlate is most likely somatization/conversion and not a vascular event. Epileptic events are a consideration, but " would also not last for that duration. We will obtain a routine EEG for completeness at this point. We will also obtain contrasted brain images, mainly to reassure her given the situation.     Exam is grossly normal today outside of tremor, which is likely enhanced physiologic + drug induced (Lithium).             Other Visit Diagnoses     Other amnesia        Transient cerebral ischemia, unspecified type        Fatigue, unspecified type        Blurry vision, bilateral        Falls frequently        Muscle weakness        Sensitivity to light        Painful sensitivity to sound        Dizziness and giddiness              Follow up: PRN    I spent a total of 90 minutes on the day of the visit.    This includes face to face time with the patient, as well as non-face to face time preparing for and completing the visit (review of prior diagnostic testing and clinical notes, obtaining or reviewing history, documenting clinical information in the EMR, discussing with supervising physician, independently interpreting and communicating results to the patient/family and coordinating ongoing care).       I appreciate the opportunity to participate in the care of this patient. Please feel free to contact me with any concerns or questions.       Gabriella Cronin, ACNPC-AG  [facility]  [address] [place] [address]

## 2022-04-14 NOTE — ASSESSMENT & PLAN NOTE
"Pt established with psychiatry -- pt and  state that they will call psychiatrist today to set up follow up given circumstances    Multiple admissions for inpatient treatment, most recently in February  Multiple statements made today that are concerning, but patient then retracts them and is adamant that she does not have any intent to harm herself or someone else. Discussed assisting to coordinate readmission via ED / [facility], but patient and  refuse.  , Edilberto, is present during the visit today. He does not feel that she has any true intent to harm herself, states that he will be home with her for the next 4 days and will monitor closely ("I know when she needs to go in for treatment")  "

## 2022-04-14 NOTE — ASSESSMENT & PLAN NOTE
Possible that spells described are migraine related but duration is uncharacteristic. Patient does not feel that they are migraine related, however.     States that migraines occur 2x per month. Using Migrelief daily.

## 2022-04-14 NOTE — Clinical Note
I can't explain these spells with any neurological diagnosis given the big picture and time course. The main feature seems to be uncontrolled chronic pain and severe depression (see my note about that). Ordered EEG and contrasted MRI mainly to reassure her. She is very concerned about the low serotonin level, which is not my area of expertise! Let me know if you have any questions or other concerns.

## 2022-04-26 ENCOUNTER — HOSPITAL ENCOUNTER (OUTPATIENT)
Dept: RADIOLOGY | Facility: HOSPITAL | Age: 52
Discharge: HOME | End: 2022-04-26
Attending: NURSE PRACTITIONER
Payer: COMMERCIAL

## 2022-04-26 DIAGNOSIS — R42 DIZZINESS AND GIDDINESS: ICD-10-CM

## 2022-04-26 PROCEDURE — 25500020 PHARM REV CODE 255: Mod: PO | Performed by: NURSE PRACTITIONER

## 2022-04-26 PROCEDURE — A9585 GADOBUTROL INJECTION: HCPCS | Mod: PO | Performed by: NURSE PRACTITIONER

## 2022-04-26 PROCEDURE — 70553 MRI BRAIN STEM W/O & W/DYE: CPT | Mod: TC,PO

## 2022-04-26 PROCEDURE — 70553 MRI BRAIN W WO CONTRAST: ICD-10-PCS | Mod: 26,,, | Performed by: RADIOLOGY

## 2022-04-26 PROCEDURE — 70553 MRI BRAIN STEM W/O & W/DYE: CPT | Mod: 26,,, | Performed by: RADIOLOGY

## 2022-04-26 RX ORDER — GADOBUTROL 604.72 MG/ML
10 INJECTION INTRAVENOUS
Status: COMPLETED | OUTPATIENT
Start: 2022-04-26 | End: 2022-04-26

## 2022-04-26 RX ADMIN — GADOBUTROL 8 ML: 604.72 INJECTION INTRAVENOUS at 11:04
